# Patient Record
Sex: FEMALE | Race: WHITE | Employment: STUDENT | URBAN - METROPOLITAN AREA
[De-identification: names, ages, dates, MRNs, and addresses within clinical notes are randomized per-mention and may not be internally consistent; named-entity substitution may affect disease eponyms.]

---

## 2018-06-19 ENCOUNTER — OFFICE VISIT (OUTPATIENT)
Dept: OBGYN | Facility: CLINIC | Age: 18
End: 2018-06-19
Attending: NURSE PRACTITIONER
Payer: COMMERCIAL

## 2018-06-19 VITALS
HEIGHT: 63 IN | DIASTOLIC BLOOD PRESSURE: 74 MMHG | SYSTOLIC BLOOD PRESSURE: 120 MMHG | BODY MASS INDEX: 19.31 KG/M2 | WEIGHT: 109 LBS | HEART RATE: 93 BPM

## 2018-06-19 DIAGNOSIS — N92.6 IRREGULAR MENSES: Primary | ICD-10-CM

## 2018-06-19 DIAGNOSIS — Z30.011 ENCOUNTER FOR INITIAL PRESCRIPTION OF CONTRACEPTIVE PILLS: ICD-10-CM

## 2018-06-19 PROCEDURE — G0463 HOSPITAL OUTPT CLINIC VISIT: HCPCS | Mod: ZF

## 2018-06-19 RX ORDER — NORETHINDRONE ACETATE AND ETHINYL ESTRADIOL .02; 1 MG/1; MG/1
1 TABLET ORAL DAILY
Qty: 63 TABLET | Refills: 0 | Status: SHIPPED | OUTPATIENT
Start: 2018-06-19 | End: 2018-08-20

## 2018-06-19 ASSESSMENT — PAIN SCALES - GENERAL: PAINLEVEL: NO PAIN (0)

## 2018-06-19 NOTE — NURSING NOTE
Chief Complaint   Patient presents with     Establish Care     C/O irregular periods   Misty Platt LPN

## 2018-06-19 NOTE — PROGRESS NOTES
"SUBJECTIVE:   Janey is a 17 yr old female, , who presents to clinic for evaluation of abnormal menses.    Used to have irregular length of cycles; now has regular intervals between menses.  Occasionally passes string-like, greyish-pink in color discharge during some menses mixed in with a little bit of bright red bleeding, and other menses are heavy, with bright red bleeding; uses a Divacup that she changes every 3-4 hours.     Occasionally has dysmenorrhea; takes ibuprofen which relieves discomfort.    Denies nausea, vomiting, or diarrhea associated with menses.     Menarche: age 12  Never been on birth control or other medication to control menses. Never been sexually active.     Denies change in vaginal discharge, vaginal odor, or itching/ irritation.    No hx of eating disoders; denies changes in hair or nails; no significant change in exercise or diet..      Patient's last menstrual period was 2018.    S/p HPV vaccines    Social Hx: Attends [employer] in [place]. Home (in the [facility]) for the summer.     OBJECTIVE:  /74  Pulse 93  Ht 1.6 m (5' 3\")  Wt 49.4 kg (109 lb)  LMP 2018  Breastfeeding? No  BMI 19.31 kg/m2  General: pleasant female in no acute distress    ASSESSMENT:  Irregular menses  (primary encounter diagnosis)  Encounter for initial prescription of contraceptive pills    PLAN:  Provided patient with reassurance that her menstrual pattern is likely a variation of normal, given regular intervals and variation in heaviness of vaginal bleeding.  Counseled pt on options for managing her menses and regulating them.  Janey desires to sart COCs today. Provided instructions for how to intiiate and take the birth control pills and what do to if a pill is missed. If she becomes sexually active in the next 7 days, instructed her to use a back-up method to prevent pregnancy. Pt has no contraindications to estrogen therapy; Reviewed warning signs to watch for and " notify provider of.  Pt to return to clinic before gong back to college in August 2018 to confirm improvement in menses and that she is pleased with this medication.  Janey expressed understanding and agreement with the plan for care.    Sofiya Pisano, JERALD, APRN, WHNP

## 2018-06-19 NOTE — LETTER
"2018       RE: Janey Bain  [address] [place] [address]     Dear Colleague,    Thank you for referring your patient, Janey Bain, to the [facility] at [facility]. Please see a copy of my visit note below.    SUBJECTIVE:   Janey is a 17 yr old female, , who presents to clinic for evaluation of abnormal menses.    Used to have irregular length of cycles; now has regular intervals between menses.  Occasionally passes string-like, greyish-pink in color discharge during some menses mixed in with a little bit of bright red bleeding, and other menses are heavy, with bright red bleeding; uses a Divacup that she changes every 3-4 hours.     Occasionally has dysmenorrhea; takes ibuprofen which relieves discomfort.    Denies nausea, vomiting, or diarrhea associated with menses.     Menarche: age 12  Never been on birth control or other medication to control menses. Never been sexually active.     Denies change in vaginal discharge, vaginal odor, or itching/ irritation.    No hx of eating disoders; denies changes in hair or nails; no significant change in exercise or diet..      Patient's last menstrual period was 2018.    S/p HPV vaccines    Social Hx: Attends [employer] in [place]. Home (in the [facility]) for the summer.     OBJECTIVE:  /74  Pulse 93  Ht 1.6 m (5' 3\")  Wt 49.4 kg (109 lb)  LMP 2018  Breastfeeding? No  BMI 19.31 kg/m2  General: pleasant female in no acute distress    ASSESSMENT:  Irregular menses  (primary encounter diagnosis)  Encounter for initial prescription of contraceptive pills    PLAN:  Provided patient with reassurance that her menstrual pattern is likely a variation of normal, given regular intervals and variation in heaviness of vaginal bleeding.  Counseled pt on options for managing her menses and regulating them.  Janey desires to sart COCs today. Provided instructions " for how to intiiate and take the birth control pills and what do to if a pill is missed. If she becomes sexually active in the next 7 days, instructed her to use a back-up method to prevent pregnancy. Pt has no contraindications to estrogen therapy; Reviewed warning signs to watch for and notify provider of.  Pt to return to clinic before gong back to college in August 2018 to confirm improvement in menses and that she is pleased with this medication.  Janey expressed understanding and agreement with the plan for care.    Sofiya Pisano, DNP, APRN, WHNP

## 2018-06-19 NOTE — MR AVS SNAPSHOT
"              After Visit Summary   6/19/2018    Janey Bain    MRN: [id number]           Patient Information     Date Of Birth          2000        Visit Information        Provider Department      6/19/2018 2:15 PM Sofiya Pisano APRN [facility]        Today's Diagnoses     Irregular menses    -  1    Encounter for initial prescription of contraceptive pills           Follow-ups after your visit        Who to contact     Please call your clinic at [phone] to:    Ask questions about your health    Make or cancel appointments    Discuss your medicines    Learn about your test results    Speak to your doctor            Additional Information About Your Visit        MyChart Information     [employer]more gives you secure access to your electronic health record. If you see a primary care provider, you can also send messages to your care team and make appointments. If you have questions, please call your primary care clinic.  If you do not have a primary care provider, please call [phone] and they will assist you.      [employer]ersmore is an electronic gateway that provides easy, online access to your medical records. With [employer]more, you can request a clinic appointment, read your test results, renew a prescription or communicate with your care team.     To access your existing account, please contact your [facility] or call [phone] for assistance.        Care EveryWhere ID     This is your Care EveryWhere ID. This could be used by other organizations to access your [facility] medical records  [id number]        Your Vitals Were     Pulse Height Last Period Breastfeeding? BMI (Body Mass Index)       93 1.6 m (5' 3\") 06/05/2018 No 19.31 kg/m2        Blood Pressure from Last 3 Encounters:   06/19/18 120/74   07/13/16 110/74   06/04/16 100/64    Weight from Last 3 Encounters:   06/19/18 49.4 kg (109 lb) (14 %)*   07/13/16 47.2 kg (104 lb) (10 %)* "   06/04/16 46.3 kg (102 lb) (8 %)*     * Growth percentiles are based on [facility] 2-20 Years data.              Today, you had the following     No orders found for display         Today's Medication Changes          These changes are accurate as of 6/19/18 11:59 PM.  If you have any questions, ask your nurse or doctor.               Start taking these medicines.        Dose/Directions    norethindrone-ethinyl estradiol 1-20 MG-MCG per tablet   Commonly known as:  MICROGESTIN 1/20   Used for:  Encounter for initial prescription of contraceptive pills, Irregular menses   Started by:  Sofiya Pisano APRN CNP        Dose:  1 tablet   Take 1 tablet by mouth daily   Quantity:  63 tablet   Refills:  0         Stop taking these medicines if you haven't already. Please contact your care team if you have questions.     albuterol 108 (90 Base) MCG/ACT Inhaler   Commonly known as:  PROAIR HFA/PROVENTIL HFA/VENTOLIN HFA   Stopped by:  Sofiya Pisano APRN CNP           TYLENOL CHILDRENS PO   Stopped by:  Sofiya Pisano APRN CNP                Where to get your medicines      These medications were sent to [facility] [address] [place] [address] AT [address] [address]    Hours:  24-hours Phone:  [phone]     norethindrone-ethinyl estradiol 1-20 MG-MCG per tablet                Primary Care Provider Office Phone # Fax #    Maricel Ho MD PhD [phone]       [address] [place] [address]        Equal Access to Services     [facility] AH: Clark Thomas, tray chung, pastora howell. So [facility] [phone].    ATENCIÓN: Si habla español, tiene a quinn disposición servicios gratuitos de asistencia lingüística. Dominic espinosa [phone].    We comply with applicable federal civil rights laws and [place] laws. We do not  discriminate on the basis of race, color, national origin, age, disability, sex, sexual orientation, or gender identity.            Thank you!     Thank you for choosing [facility]  for your care. Our goal is always to provide you with excellent care. Hearing back from our patients is one way we can continue to improve our services. Please take a few minutes to complete the written survey that you may receive in the mail after your visit with us. Thank you!             Your Updated Medication List - Protect others around you: Learn how to safely use, store and throw away your medicines at www.disposemymeds.org.          This list is accurate as of 6/19/18 11:59 PM.  Always use your most recent med list.                   Brand Name Dispense Instructions for use Diagnosis    norethindrone-ethinyl estradiol 1-20 MG-MCG per tablet    MICROGESTIN 1/20    63 tablet    Take 1 tablet by mouth daily    Encounter for initial prescription of contraceptive pills, Irregular menses

## 2018-07-10 ENCOUNTER — OFFICE VISIT (OUTPATIENT)
Dept: URGENT CARE | Facility: URGENT CARE | Age: 18
End: 2018-07-10
Payer: COMMERCIAL

## 2018-07-10 VITALS — OXYGEN SATURATION: 100 % | WEIGHT: 110.4 LBS | HEART RATE: 89 BPM | BODY MASS INDEX: 19.56 KG/M2 | TEMPERATURE: 98.5 F

## 2018-07-10 DIAGNOSIS — R07.0 THROAT PAIN: ICD-10-CM

## 2018-07-10 DIAGNOSIS — J03.80 ACUTE TONSILLITIS DUE TO INFECTIOUS MONONUCLEOSIS: Primary | ICD-10-CM

## 2018-07-10 DIAGNOSIS — B27.90 ACUTE TONSILLITIS DUE TO INFECTIOUS MONONUCLEOSIS: Primary | ICD-10-CM

## 2018-07-10 LAB
DEPRECATED S PYO AG THROAT QL EIA: NORMAL
HETEROPH AB SER QL: POSITIVE
SPECIMEN SOURCE: NORMAL

## 2018-07-10 PROCEDURE — 87880 STREP A ASSAY W/OPTIC: CPT | Performed by: PHYSICIAN ASSISTANT

## 2018-07-10 PROCEDURE — 87081 CULTURE SCREEN ONLY: CPT | Performed by: PHYSICIAN ASSISTANT

## 2018-07-10 PROCEDURE — 99214 OFFICE O/P EST MOD 30 MIN: CPT | Performed by: PHYSICIAN ASSISTANT

## 2018-07-10 PROCEDURE — 36415 COLL VENOUS BLD VENIPUNCTURE: CPT | Performed by: PHYSICIAN ASSISTANT

## 2018-07-10 PROCEDURE — 86308 HETEROPHILE ANTIBODY SCREEN: CPT | Performed by: PHYSICIAN ASSISTANT

## 2018-07-10 RX ORDER — DEXAMETHASONE SODIUM PHOSPHATE 10 MG/ML
6 INJECTION INTRAMUSCULAR; INTRAVENOUS ONCE
Qty: 0.6 ML | Refills: 0
Start: 2018-07-10 | End: 2019-08-12

## 2018-07-10 NOTE — PATIENT INSTRUCTIONS
Your Strep test was negative.  Your mono test was positive.  We gave you a dose of steroids today to help with tonsil enlargement.  This will last in your body for a few days.    See below info on mononucleosis treatment and care for your symptoms.      Mononucleosis  Mononucleosis (also called mono) is a contagious viral infection. Most infants and children exposed to the virus get only mild flu-like symptoms or no symptoms at all. However, infection is usually more serious in teens and young adults. While the virus is active it causes symptoms and can spread to others. After symptoms subside, the virus stays in the body and eventually becomes inactive. Once you have one case of mono, you are unlikely to develop symptoms again.  The virus is usually spread by contact with saliva, often by kissing. It may also spread by breastmilk, blood, or sexual contact. It takes about 4 to 6 weeks to develop symptoms after exposure.  Early symptoms include headache, nausea, tiredness and general muscle aching. This is followed by sore throat and fever. Lymph glands in the neck, under the arms, or in the groin may be swollen. Symptoms usually go away in about 1 to 2 months. But they can last up to four months.  If symptoms have been present less than 1 week or more than 3 weeks, the blood test used to diagnose this disease may be negative even though you have the illness.  In this case, other tests may be done.  Taking the antibiotics ampicillin or amoxicillin during a mono infection may cause a skin rash. This is not serious and will fade in about one week. The cause is a reaction of the drug with the virus.  Mono can cause your spleen to swell. The spleen is a fist-sized organ in the upper left abdomen that stores red blood cells. Injury to a swollen spleen can cause the spleen to rupture. This can cause life-threatening internal bleeding. To avoid this, do not play contact sports or perform strenuous activity for 8 weeks, or  until cleared by your healthcare provider. A sharp blow could rupture a swollen spleen  Home care    Rest in bed until the fever and weakness have gone away.    Drink plenty of fluids, but avoid alcohol. Otherwise, you may eat a regular diet.    Ask your healthcare provider about using over-the-counter medicines to treat symptoms such as fever, pain, or an itchy rash.    Over-the-counter throat lozenges may help soothe a sore throat. Gargling with warm salt water (1/2 teaspoon in 1 glass of warm water) may also be soothing to the throat.    You may return to work or school after the fever goes away and you are feeling better. Continue to follow any activity restrictions you have been given.  Preventing spread of the virus  To limit the spread of the virus, avoid exposing others to your saliva for at least 6 months after your illness (no kissing or sharing utensils, drinking glasses, or toothbrushes).  Follow-up care  Follow up with your healthcare provider within 1 to 2 weeks or as advised by our staff to be sure that there are no complications. If symptoms of extreme fatigue and swollen glands last longer than 6 months, see your healthcare provider for further testing.  When to seek medical advice  Call your healthcare provider right away if any of the following occur:    Excessive coughing    Yellow skin or eyes    Trouble swallowing  Call 911  Call 911 if any of the following occur:    Severe or worsening abdominal pain    Trouble breathing  Date Last Reviewed: 9/25/2015 2000-2017 [employer]. [address] [place] [address], [place] [address]. All rights reserved. This information is not intended as a substitute for professional medical care. Always follow your healthcare professional's instructions.

## 2018-07-10 NOTE — MR AVS SNAPSHOT
After Visit Summary   7/10/2018    Janey Bain    MRN: [id number]           Patient Information     Date Of Birth          2000        Visit Information        Provider Department      7/10/2018 5:05 PM Mary Luz PA-C [facility]        Today's Diagnoses     Acute tonsillitis due to infectious mononucleosis    -  1    Throat pain          Care Instructions    Your Strep test was negative.  Your mono test was positive.  We gave you a dose of steroids today to help with tonsil enlargement.  This will last in your body for a few days.    See below info on mononucleosis treatment and care for your symptoms.      Mononucleosis  Mononucleosis (also called mono) is a contagious viral infection. Most infants and children exposed to the virus get only mild flu-like symptoms or no symptoms at all. However, infection is usually more serious in teens and young adults. While the virus is active it causes symptoms and can spread to others. After symptoms subside, the virus stays in the body and eventually becomes inactive. Once you have one case of mono, you are unlikely to develop symptoms again.  The virus is usually spread by contact with saliva, often by kissing. It may also spread by breastmilk, blood, or sexual contact. It takes about 4 to 6 weeks to develop symptoms after exposure.  Early symptoms include headache, nausea, tiredness and general muscle aching. This is followed by sore throat and fever. Lymph glands in the neck, under the arms, or in the groin may be swollen. Symptoms usually go away in about 1 to 2 months. But they can last up to four months.  If symptoms have been present less than 1 week or more than 3 weeks, the blood test used to diagnose this disease may be negative even though you have the illness.  In this case, other tests may be done.  Taking the antibiotics ampicillin or amoxicillin during a mono infection may cause a skin rash. This is  not serious and will fade in about one week. The cause is a reaction of the drug with the virus.  Mono can cause your spleen to swell. The spleen is a fist-sized organ in the upper left abdomen that stores red blood cells. Injury to a swollen spleen can cause the spleen to rupture. This can cause life-threatening internal bleeding. To avoid this, do not play contact sports or perform strenuous activity for 8 weeks, or until cleared by your healthcare provider. A sharp blow could rupture a swollen spleen  Home care    Rest in bed until the fever and weakness have gone away.    Drink plenty of fluids, but avoid alcohol. Otherwise, you may eat a regular diet.    Ask your healthcare provider about using over-the-counter medicines to treat symptoms such as fever, pain, or an itchy rash.    Over-the-counter throat lozenges may help soothe a sore throat. Gargling with warm salt water (1/2 teaspoon in 1 glass of warm water) may also be soothing to the throat.    You may return to work or school after the fever goes away and you are feeling better. Continue to follow any activity restrictions you have been given.  Preventing spread of the virus  To limit the spread of the virus, avoid exposing others to your saliva for at least 6 months after your illness (no kissing or sharing utensils, drinking glasses, or toothbrushes).  Follow-up care  Follow up with your healthcare provider within 1 to 2 weeks or as advised by our staff to be sure that there are no complications. If symptoms of extreme fatigue and swollen glands last longer than 6 months, see your healthcare provider for further testing.  When to seek medical advice  Call your healthcare provider right away if any of the following occur:    Excessive coughing    Yellow skin or eyes    Trouble swallowing  Call 911  Call 911 if any of the following occur:    Severe or worsening abdominal pain    Trouble breathing  Date Last Reviewed: 9/25/2015 2000-2017 [facility]  [employer]. [address] [place] [address]. All rights reserved. This information is not intended as a substitute for professional medical care. Always follow your healthcare professional's instructions.                Follow-ups after your visit        Follow-up notes from your care team     Return in about 1 week (around 7/17/2018).      Who to contact     If you have questions or need follow up information about today's clinic visit or your schedule please contact [facility] directly at [phone].  Normal or non-critical lab and imaging results will be communicated to you by GetBulbhart, letter or phone within 4 business days after the clinic has received the results. If you do not hear from us within 7 days, please contact the clinic through [employer] or phone. If you have a critical or abnormal lab result, we will notify you by phone as soon as possible.  Submit refill requests through [employer] or call your pharmacy and they will forward the refill request to us. Please allow 3 business days for your refill to be completed.          Additional Information About Your Visit        GetBulbhart Information     [employer] gives you secure access to your electronic health record. If you see a primary care provider, you can also send messages to your care team and make appointments. If you have questions, please call your primary care clinic.  If you do not have a primary care provider, please call [phone] and they will assist you.        Care EveryWhere ID     This is your Care EveryWhere ID. This could be used by other organizations to access your [facility] medical records  [id number]        Your Vitals Were     Pulse Temperature Pulse Oximetry BMI (Body Mass Index)          89 98.5  F (36.9  C) (Tympanic) 100% 19.56 kg/m2         Blood Pressure from Last 3 Encounters:   06/19/18 120/74   07/13/16 110/74   06/04/16 100/64    Weight from Last 3 Encounters:   07/10/18 110 lb 6.4 oz  (50.1 kg) (16 %)*   06/19/18 109 lb (49.4 kg) (14 %)*   07/13/16 104 lb (47.2 kg) (10 %)*     * Growth percentiles are based on [facility] 2-20 Years data.              We Performed the Following     Beta strep group A culture     Mononucleosis screen     Strep, Rapid Screen          Today's Medication Changes          These changes are accurate as of 7/10/18  6:28 PM.  If you have any questions, ask your nurse or doctor.               Start taking these medicines.        Dose/Directions    dexamethasone 10 MG/ML injection   Commonly known as:  DECADRON   Used for:  Acute tonsillitis due to infectious mononucleosis   Started by:  Mary Luz PA-C        Dose:  6 mg   Inject 0.6 mLs (6 mg) into the vein once for 1 dose   Quantity:  0.6 mL   Refills:  0            Where to get your medicines      Some of these will need a paper prescription and others can be bought over the counter.  Ask your nurse if you have questions.     You don't need a prescription for these medications     dexamethasone 10 MG/ML injection                Primary Care Provider Office Phone # Fax #    Maricel Ho MD PhD [phone]       [address] [place] [address]        Equal Access to Services     DALI SAWYER AH: Clark Thomas, watuda juliet, qaeuniceta kaalmada sean, pastora spencer. So [facility] [phone].    ATENCIÓN: Si habla español, tiene a quinn disposición servicios gratuitos de asistencia lingüística. Llame al [phone].    We comply with applicable federal civil rights laws and [place] laws. We do not discriminate on the basis of race, color, national origin, age, disability, sex, sexual orientation, or gender identity.            Thank you!     Thank you for choosing [facility]  for your care. Our goal is always to provide you with excellent care. Hearing back from our patients is one way we can continue to improve our  services. Please take a few minutes to complete the written survey that you may receive in the mail after your visit with us. Thank you!             Your Updated Medication List - Protect others around you: Learn how to safely use, store and throw away your medicines at www.disposemymeds.org.          This list is accurate as of 7/10/18  6:28 PM.  Always use your most recent med list.                   Brand Name Dispense Instructions for use Diagnosis    dexamethasone 10 MG/ML injection    DECADRON    0.6 mL    Inject 0.6 mLs (6 mg) into the vein once for 1 dose    Acute tonsillitis due to infectious mononucleosis       norethindrone-ethinyl estradiol 1-20 MG-MCG per tablet    MICROGESTIN 1/20    63 tablet    Take 1 tablet by mouth daily    Encounter for initial prescription of contraceptive pills, Irregular menses

## 2018-07-10 NOTE — PROGRESS NOTES
"SUBJECTIVE:   Janey Bain is a 17 year old female presenting with a chief complaint of   Chief Complaint   Patient presents with     Urgent Care     Pt in clinic to have eval for sore throat.     Pharyngitis       Onset of symptoms was 1 week(s) ago.  Course of illness is worsening.    Severity moderately severe  Current and Associated symptoms: she had onset of sore throat last week. Then, developed cough, headache, body aches. No fever. No runny nose. She admits to nasal congestion. Most of the symptoms have improved and almost resolved, however the sore throat has gotten worse. She admits to having swollen glands in her neck. She is tolerating PO intake. No nausea, vomiting. No rashes.   Treatment measures tried include: DayQuil, Tylenol  Predisposing factors include: no known ill contacts.     ROS   See HPI, otherwise healthy    PMH:  Past Medical History:   Diagnosis Date     Pneumonia 2012    hospitalized       Current medications:  Current Outpatient Prescriptions   Medication Sig Dispense Refill     dexamethasone (DECADRON) 10 MG/ML injection Inject 0.6 mLs (6 mg) into the vein once for 1 dose 0.6 mL 0     norethindrone-ethinyl estradiol (MICROGESTIN 1/20) 1-20 MG-MCG per tablet Take 1 tablet by mouth daily (Patient not taking: Reported on 7/10/2018) 63 tablet 0       Surgical History:  Past Surgical History:   Procedure Laterality Date     NO HISTORY OF SURGERY         Family history:  Family History   Problem Relation Age of Onset     Breast Cancer Mother      early 50's; \"negative genetic testing\"     Lymphoma Maternal Grandmother      Colon Cancer Paternal Grandmother      Coronary Artery Disease Early Onset Paternal Grandfather      Cerebrovascular Disease Paternal Grandfather          Social History:  Social History   Substance Use Topics     Smoking status: Never Smoker     Smokeless tobacco: Never Used      Comment: nonsmoking home     Alcohol use Not on file      Comment: none  "         OBJECTIVE  Pulse 89  Temp 98.5  F (36.9  C) (Tympanic)  Wt 110 lb 6.4 oz (50.1 kg)  SpO2 100%  BMI 19.56 kg/m2    General: alert, appears well, NAD. Afebrile.  Skin: no suspicious lesions or rashes.  HEENT: Normocephalic.   Eyes: conjunctiva clear.   Ears: TMs with mild serous effusion bilaterally. No erythema or bulging.   Nose: no nasal polyps. No edema of nasal mucosa. No rhinorrhea.  Oropharynx: MMM. 2+ bilateral tonsillar hypertrophy with scant, spotty white exudates. No tonsillar erythema. No posterior pharyngeal erythema. No oral lesions. Uvula midline.    Neck: supple, no lymphadenopathy. Neck AROM intact- no meningismus.  Respiratory: No distress. Equal inspiration to bilateral bases. No crackles wheeze, rhonchi, rales.   Cardiovascular: RRR. No murmurs, clicks, gallups, or rub.   Gastrointestinal: Abdomen soft, nontender, BS present. No masses, organomegaly.  Psychiatric: mentation appears normal and affect normal/bright           Labs:  Results for orders placed or performed in visit on 07/10/18 (from the past 24 hour(s))   Strep, Rapid Screen   Result Value Ref Range    Specimen Description Throat     Rapid Strep A Screen       NEGATIVE: No Group A streptococcal antigen detected by immunoassay, await culture report.   Mononucleosis screen   Result Value Ref Range    Mononucleosis Screen Positive (A) NEG^Negative           ASSESSMENT/PLAN:    ICD-10-CM    1. Acute tonsillitis due to infectious mononucleosis J03.80 dexamethasone (DECADRON) 10 MG/ML injection    B27.90    2. Throat pain R07.0 Strep, Rapid Screen     Beta strep group A culture     Mononucleosis screen           Medical Decision Making:    Otherwise healthy 16yo female presenting for sore throat x 1 week. Patient appears well and is tolerating oral intake. No signs of peritonsillar or retropharyngeal abscess on exam. Clear lungs.     Differential Diagnosis:  -Strep tonsillitis- negative Rapid Strep.   -other bacterial tonsillitis-  possible, but have less suspicion as she has been afebrile, appears well, and there are only few spotty exudates. With positive monospot as below, do not think that starting antibiotics is indicated today.  -mononucleosis- have most suspicion for this as she is afebrile, had other URI symptoms at outset of illness, and duration of illness for 1 week - longer than I would expect for a bacterial tonsillitis to present. Monospot today was positive. She does not appear to have any signs of complications of mono today. No splenomegaly on abdominal exam. She appears nontoxic. I think she is appropriate for ongoing outpatient therapy.    Discussed course of mono. Discussed contagiousness. Discussed follow up- recommend f/up with PCP in 1 week for a recheck.  Discussed indications for sooner follow up.    At the end of the encounter, I discussed all available results, as well as the diagnosis and any associated medications. I discussed red flags for immediate return to clinic/ER, as well as indications for follow up. Refer to patient instructions below, which were all addressed with patient. Patient understood and agreed to plan. Patient was appropriate for discharge.      Patient Instructions   Your Strep test was negative.  Your mono test was positive.  We gave you a dose of steroids today to help with tonsil enlargement.  This will last in your body for a few days.    See below info on mononucleosis treatment and care for your symptoms.      Mononucleosis  Mononucleosis (also called mono) is a contagious viral infection. Most infants and children exposed to the virus get only mild flu-like symptoms or no symptoms at all. However, infection is usually more serious in teens and young adults. While the virus is active it causes symptoms and can spread to others. After symptoms subside, the virus stays in the body and eventually becomes inactive. Once you have one case of mono, you are unlikely to develop symptoms  again.  The virus is usually spread by contact with saliva, often by kissing. It may also spread by breastmilk, blood, or sexual contact. It takes about 4 to 6 weeks to develop symptoms after exposure.  Early symptoms include headache, nausea, tiredness and general muscle aching. This is followed by sore throat and fever. Lymph glands in the neck, under the arms, or in the groin may be swollen. Symptoms usually go away in about 1 to 2 months. But they can last up to four months.  If symptoms have been present less than 1 week or more than 3 weeks, the blood test used to diagnose this disease may be negative even though you have the illness.  In this case, other tests may be done.  Taking the antibiotics ampicillin or amoxicillin during a mono infection may cause a skin rash. This is not serious and will fade in about one week. The cause is a reaction of the drug with the virus.  Mono can cause your spleen to swell. The spleen is a fist-sized organ in the upper left abdomen that stores red blood cells. Injury to a swollen spleen can cause the spleen to rupture. This can cause life-threatening internal bleeding. To avoid this, do not play contact sports or perform strenuous activity for 8 weeks, or until cleared by your healthcare provider. A sharp blow could rupture a swollen spleen  Home care    Rest in bed until the fever and weakness have gone away.    Drink plenty of fluids, but avoid alcohol. Otherwise, you may eat a regular diet.    Ask your healthcare provider about using over-the-counter medicines to treat symptoms such as fever, pain, or an itchy rash.    Over-the-counter throat lozenges may help soothe a sore throat. Gargling with warm salt water (1/2 teaspoon in 1 glass of warm water) may also be soothing to the throat.    You may return to work or school after the fever goes away and you are feeling better. Continue to follow any activity restrictions you have been given.  Preventing spread of the  virus  To limit the spread of the virus, avoid exposing others to your saliva for at least 6 months after your illness (no kissing or sharing utensils, drinking glasses, or toothbrushes).  Follow-up care  Follow up with your healthcare provider within 1 to 2 weeks or as advised by our staff to be sure that there are no complications. If symptoms of extreme fatigue and swollen glands last longer than 6 months, see your healthcare provider for further testing.  When to seek medical advice  Call your healthcare provider right away if any of the following occur:    Excessive coughing    Yellow skin or eyes    Trouble swallowing  Call 911  Call 911 if any of the following occur:    Severe or worsening abdominal pain    Trouble breathing  Date Last Reviewed: 9/25/2015 2000-2017 [employer]. [address] [place] [address], [place] [address]. All rights reserved. This information is not intended as a substitute for professional medical care. Always follow your healthcare professional's instructions.                  Mary Luz PA-C

## 2018-07-11 LAB
BACTERIA SPEC CULT: NORMAL
SPECIMEN SOURCE: NORMAL

## 2018-07-14 ENCOUNTER — HOSPITAL ENCOUNTER (EMERGENCY)
Facility: CLINIC | Age: 18
Discharge: HOME | End: 2018-07-14
Attending: EMERGENCY MEDICINE | Admitting: EMERGENCY MEDICINE
Payer: COMMERCIAL

## 2018-07-14 VITALS
HEART RATE: 110 BPM | SYSTOLIC BLOOD PRESSURE: 113 MMHG | OXYGEN SATURATION: 98 % | BODY MASS INDEX: 19.84 KG/M2 | DIASTOLIC BLOOD PRESSURE: 71 MMHG | WEIGHT: 112 LBS | TEMPERATURE: 100.7 F | RESPIRATION RATE: 16 BRPM

## 2018-07-14 DIAGNOSIS — B17.8 MONONUCLEOSIS, INFECTIOUS, WITH HEPATITIS: ICD-10-CM

## 2018-07-14 DIAGNOSIS — B27.99 MONONUCLEOSIS, INFECTIOUS, WITH HEPATITIS: ICD-10-CM

## 2018-07-14 LAB
ALBUMIN SERPL-MCNC: 3.3 G/DL (ref 3.4–5)
ALP SERPL-CCNC: 208 U/L (ref 40–150)
ALT SERPL W P-5'-P-CCNC: 312 U/L (ref 0–50)
ANION GAP SERPL CALCULATED.3IONS-SCNC: 6 MMOL/L (ref 3–14)
ANISOCYTOSIS BLD QL SMEAR: SLIGHT
AST SERPL W P-5'-P-CCNC: 100 U/L (ref 0–35)
BASOPHILS # BLD AUTO: 0.1 10E9/L (ref 0–0.2)
BASOPHILS NFR BLD AUTO: 0.9 %
BILIRUB SERPL-MCNC: 0.2 MG/DL (ref 0.2–1.3)
BUN SERPL-MCNC: 7 MG/DL (ref 7–30)
CALCIUM SERPL-MCNC: 8.9 MG/DL (ref 8.5–10.1)
CHLORIDE SERPL-SCNC: 107 MMOL/L (ref 96–110)
CO2 SERPL-SCNC: 29 MMOL/L (ref 20–32)
CREAT SERPL-MCNC: 0.64 MG/DL (ref 0.5–1)
DEPRECATED S PYO AG THROAT QL EIA: NORMAL
DIFFERENTIAL METHOD BLD: ABNORMAL
EOSINOPHIL # BLD AUTO: 0.1 10E9/L (ref 0–0.7)
EOSINOPHIL NFR BLD AUTO: 1 %
ERYTHROCYTE [DISTWIDTH] IN BLOOD BY AUTOMATED COUNT: 13.8 % (ref 10–15)
GFR SERPL CREATININE-BSD FRML MDRD: >90 ML/MIN/1.7M2
GLUCOSE SERPL-MCNC: 117 MG/DL (ref 70–99)
HCT VFR BLD AUTO: 39.4 % (ref 35–47)
HGB BLD-MCNC: 12.8 G/DL (ref 11.7–15.7)
IMM GRANULOCYTES # BLD: 0 10E9/L (ref 0–0.4)
IMM GRANULOCYTES NFR BLD: 0.2 %
LYMPHOCYTES # BLD AUTO: 6.6 10E9/L (ref 0.8–5.3)
LYMPHOCYTES NFR BLD AUTO: 57.2 %
MACROCYTES BLD QL SMEAR: PRESENT
MCH RBC QN AUTO: 28 PG (ref 26.5–33)
MCHC RBC AUTO-ENTMCNC: 32.5 G/DL (ref 31.5–36.5)
MCV RBC AUTO: 86 FL (ref 78–100)
MONOCYTES # BLD AUTO: 1.2 10E9/L (ref 0–1.3)
MONOCYTES NFR BLD AUTO: 10.7 %
NEUTROPHILS # BLD AUTO: 3.4 10E9/L (ref 1.6–8.3)
NEUTROPHILS NFR BLD AUTO: 30 %
NRBC # BLD AUTO: 0 10*3/UL
NRBC BLD AUTO-RTO: 0 /100
PLATELET # BLD AUTO: 315 10E9/L (ref 150–450)
PLATELET # BLD EST: NORMAL 10*3/UL
POTASSIUM SERPL-SCNC: 4.2 MMOL/L (ref 3.4–5.3)
PROT SERPL-MCNC: 8 G/DL (ref 6.8–8.8)
RBC # BLD AUTO: 4.57 10E12/L (ref 3.8–5.2)
SODIUM SERPL-SCNC: 142 MMOL/L (ref 133–144)
SPECIMEN SOURCE: NORMAL
WBC # BLD AUTO: 11.5 10E9/L (ref 4–11)

## 2018-07-14 PROCEDURE — 87081 CULTURE SCREEN ONLY: CPT | Performed by: EMERGENCY MEDICINE

## 2018-07-14 PROCEDURE — 85025 COMPLETE CBC W/AUTO DIFF WBC: CPT | Performed by: EMERGENCY MEDICINE

## 2018-07-14 PROCEDURE — 25000132 ZZH RX MED GY IP 250 OP 250 PS 637: Performed by: EMERGENCY MEDICINE

## 2018-07-14 PROCEDURE — 99284 EMERGENCY DEPT VISIT MOD MDM: CPT | Mod: Z6 | Performed by: EMERGENCY MEDICINE

## 2018-07-14 PROCEDURE — 80053 COMPREHEN METABOLIC PANEL: CPT | Performed by: EMERGENCY MEDICINE

## 2018-07-14 PROCEDURE — 96375 TX/PRO/DX INJ NEW DRUG ADDON: CPT | Performed by: EMERGENCY MEDICINE

## 2018-07-14 PROCEDURE — 96374 THER/PROPH/DIAG INJ IV PUSH: CPT | Performed by: EMERGENCY MEDICINE

## 2018-07-14 PROCEDURE — 99285 EMERGENCY DEPT VISIT HI MDM: CPT | Mod: 25 | Performed by: EMERGENCY MEDICINE

## 2018-07-14 PROCEDURE — 25000128 H RX IP 250 OP 636: Performed by: EMERGENCY MEDICINE

## 2018-07-14 PROCEDURE — 96361 HYDRATE IV INFUSION ADD-ON: CPT | Performed by: EMERGENCY MEDICINE

## 2018-07-14 PROCEDURE — 87880 STREP A ASSAY W/OPTIC: CPT | Performed by: EMERGENCY MEDICINE

## 2018-07-14 RX ORDER — OXYCODONE HCL 5 MG/5 ML
5 SOLUTION, ORAL ORAL ONCE
Status: COMPLETED | OUTPATIENT
Start: 2018-07-14 | End: 2018-07-14

## 2018-07-14 RX ORDER — DEXAMETHASONE SODIUM PHOSPHATE 10 MG/ML
10 INJECTION, SOLUTION INTRAMUSCULAR; INTRAVENOUS ONCE
Status: COMPLETED | OUTPATIENT
Start: 2018-07-14 | End: 2018-07-14

## 2018-07-14 RX ORDER — OXYCODONE HCL 5 MG/5 ML
5 SOLUTION, ORAL ORAL EVERY 4 HOURS PRN
Qty: 150 ML | Refills: 0 | Status: SHIPPED | OUTPATIENT
Start: 2018-07-14 | End: 2019-08-12

## 2018-07-14 RX ORDER — MORPHINE SULFATE 2 MG/ML
2 INJECTION, SOLUTION INTRAMUSCULAR; INTRAVENOUS ONCE
Status: COMPLETED | OUTPATIENT
Start: 2018-07-14 | End: 2018-07-14

## 2018-07-14 RX ORDER — ONDANSETRON 2 MG/ML
4 INJECTION INTRAMUSCULAR; INTRAVENOUS ONCE
Status: DISCONTINUED | OUTPATIENT
Start: 2018-07-14 | End: 2018-07-15 | Stop reason: HOSPADM

## 2018-07-14 RX ADMIN — SODIUM CHLORIDE 1000 ML: 9 INJECTION, SOLUTION INTRAVENOUS at 21:07

## 2018-07-14 RX ADMIN — DEXAMETHASONE SODIUM PHOSPHATE 10 MG: 10 INJECTION, SOLUTION INTRAMUSCULAR; INTRAVENOUS at 21:14

## 2018-07-14 RX ADMIN — SODIUM CHLORIDE 1000 ML: 9 INJECTION, SOLUTION INTRAVENOUS at 23:03

## 2018-07-14 RX ADMIN — OXYCODONE HYDROCHLORIDE 5 MG: 5 SOLUTION ORAL at 21:57

## 2018-07-14 RX ADMIN — MORPHINE SULFATE 2 MG: 2 INJECTION, SOLUTION INTRAMUSCULAR; INTRAVENOUS at 21:08

## 2018-07-14 RX ADMIN — SODIUM CHLORIDE 1000 ML: 9 INJECTION, SOLUTION INTRAVENOUS at 22:00

## 2018-07-14 ASSESSMENT — ENCOUNTER SYMPTOMS
TROUBLE SWALLOWING: 1
ABDOMINAL PAIN: 0
CHILLS: 1
FATIGUE: 1
SHORTNESS OF BREATH: 1
SORE THROAT: 1
FEVER: 0
HEADACHES: 1

## 2018-07-14 NOTE — ED AVS SNAPSHOT
[facility], [place], Emergency Department    [address]    [facility] [address]    Phone:  [phone]    Fax:  [phone]                                       Janey Bain   MRN: [id number]    Department:  [facility], Emergency Department   Date of Visit:  7/14/2018           After Visit Summary Signature Page     I have received my discharge instructions, and my questions have been answered. I have discussed any challenges I see with this plan with the nurse or doctor.    ..........................................................................................................................................  Patient/Patient Representative Signature      ..........................................................................................................................................  Patient Representative Print Name and Relationship to Patient    ..................................................               ................................................  Date                                            Time    ..........................................................................................................................................  Reviewed by Signature/Title    ...................................................              ..............................................  Date                                                            Time

## 2018-07-14 NOTE — ED AVS SNAPSHOT
[facility], Emergency Department    [address]    [place] [address]    Phone:  [phone]    Fax:  [phone]                                       Janey Bain   MRN: [id number]    Department:  [facility], Emergency Department   Date of Visit:  7/14/2018           Patient Information     Date Of Birth          2000        Your diagnoses for this visit were:     Mononucleosis, infectious, with hepatitis        You were seen by Umu Casiano MD.        Discharge Instructions       Go home and rest.  Focus on staying hydrated.      You can take roxicodone for pain if needed.     Please follow up with your primary care doctor early this week for recheck.  Return if worsening.  Your liver tests are elevated and will need to be rechecked in the future.  Avoid tylenol since this is metabolized by the liver.         Mononucleosis  Mononucleosis (also called mono) is a contagious viral infection. Most infants and children exposed to the virus get only mild flu-like symptoms or no symptoms at all. However, infection is usually more serious in teens and young adults. While the virus is active it causes symptoms and can spread to others. After symptoms subside, the virus stays in the body and eventually becomes inactive. Once you have one case of mono, you are unlikely to develop symptoms again.  The virus is usually spread by contact with saliva, often by kissing. It may also spread by breastmilk, blood, or sexual contact. It takes about 4 to 6 weeks to develop symptoms after exposure.  Early symptoms include headache, nausea, tiredness and general muscle aching. This is followed by sore throat and fever. Lymph glands in the neck, under the arms, or in the groin may be swollen. Symptoms usually go away in about 1 to 2 months. But they can last up to four months.  If symptoms have been present less than 1 week or more than 3 weeks, the blood test used to diagnose this disease may be negative even  though you have the illness.  In this case, other tests may be done.  Taking the antibiotics ampicillin or amoxicillin during a mono infection may cause a skin rash. This is not serious and will fade in about one week. The cause is a reaction of the drug with the virus.  Mono can cause your spleen to swell. The spleen is a fist-sized organ in the upper left abdomen that stores red blood cells. Injury to a swollen spleen can cause the spleen to rupture. This can cause life-threatening internal bleeding. To avoid this, do not play contact sports or perform strenuous activity for 8 weeks, or until cleared by your healthcare provider. A sharp blow could rupture a swollen spleen  Home care    Rest in bed until the fever and weakness have gone away.    Drink plenty of fluids, but avoid alcohol. Otherwise, you may eat a regular diet.    Ask your healthcare provider about using over-the-counter medicines to treat symptoms such as fever, pain, or an itchy rash.    Over-the-counter throat lozenges may help soothe a sore throat. Gargling with warm salt water (1/2 teaspoon in 1 glass of warm water) may also be soothing to the throat.    You may return to work or school after the fever goes away and you are feeling better. Continue to follow any activity restrictions you have been given.  Preventing spread of the virus  To limit the spread of the virus, avoid exposing others to your saliva for at least 6 months after your illness (no kissing or sharing utensils, drinking glasses, or toothbrushes).  Follow-up care  Follow up with your healthcare provider within 1 to 2 weeks or as advised by our staff to be sure that there are no complications. If symptoms of extreme fatigue and swollen glands last longer than 6 months, see your healthcare provider for further testing.  When to seek medical advice  Call your healthcare provider right away if any of the following occur:    Excessive coughing    Yellow skin or eyes    Trouble  swallowing  Call 911  Call 911 if any of the following occur:    Severe or worsening abdominal pain    Trouble breathing  Date Last Reviewed: 9/25/2015 2000-2017 [employer]. [address] [place] [address], [place] [address]. All rights reserved. This information is not intended as a substitute for professional medical care. Always follow your healthcare professional's instructions.          Your next 10 appointments already scheduled     Jul 18, 2018  7:00 AM CDT   (Arrive by 6:45 AM)   Return Visit with Ishmael Espinosa MD   [facility] ([facility])    [address] [place] [address]  [address] [facility] [address] [phone]              24 Hour Appointment Hotline       To make an appointment at any [facility], call [phone] ([phone]). If you don't have a family doctor or clinic, we will help you find one. [facility] are conveniently located to serve the needs of you and your family.             Review of your medicines      START taking        Dose / Directions Last dose taken    oxyCODONE 5 MG/5ML solution   Commonly known as:  ROXICODONE   Dose:  5 mg   Quantity:  150 mL        Take 5 mLs (5 mg) by mouth every 4 hours as needed for pain   Refills:  0          Our records show that you are taking the medicines listed below. If these are incorrect, please call your family doctor or clinic.        Dose / Directions Last dose taken    dexamethasone 10 MG/ML injection   Commonly known as:  DECADRON   Dose:  6 mg   Quantity:  0.6 mL        Inject 0.6 mLs (6 mg) into the vein once for 1 dose   Refills:  0        norethindrone-ethinyl estradiol 1-20 MG-MCG per tablet   Commonly known as:  MICROGESTIN 1/20   Dose:  1 tablet   Quantity:  63 tablet        Take 1 tablet by mouth daily   Refills:  0                Information about OPIOIDS     PRESCRIPTION OPIOIDS: WHAT YOU NEED TO KNOW   We gave you an opioid (narcotic) pain medicine. It is  important to manage your pain, but opioids are not always the best choice. You should first try all the other options your care team gave you. Take this medicine for as short a time (and as few doses) as possible.     These medicines have risks:    DO NOT drive when on new or higher doses of pain medicine. These medicines can affect your alertness and reaction times, and you could be arrested for driving under the influence (DUI). If you need to use opioids long-term, talk to your care team about driving.    DO NOT operate heave machinery    DO NOT do any other dangerous activities while taking these medicines.     DO NOT drink any alcohol while taking these medicines.      If the opioid prescribed includes acetaminophen, DO NOT take with any other medicines that contain acetaminophen. Read all labels carefully. Look for the word  acetaminophen  or  Tylenol.  Ask your pharmacist if you have questions or are unsure.    You can get addicted to pain medicines, especially if you have a history of addiction (chemical, alcohol or substance dependence). Talk to your care team about ways to reduce this risk.    Store your pills in a secure place, locked if possible. We will not replace any lost or stolen medicine. If you don t finish your medicine, please throw away (dispose) as directed by your pharmacist. The [place] [employer]ution [employer] Agency has more information about safe disposal: https://www.pca.[facility].[place]/living-green/managing-unwanted-medications.     All opioids tend to cause constipation. Drink plenty of water and eat foods that have a lot of fiber, such as fruits, vegetables, prune juice, apple juice and high-fiber cereal. Take a laxative (Miralax, milk of magnesia, Colace, Senna) if you don t move your bowels at least every other day.         Prescriptions were sent or printed at these locations (1 Prescription)                   Other Prescriptions                Printed at Department/Unit printer (1 of 1)          oxyCODONE (ROXICODONE) 5 MG/5ML solution                Procedures and tests performed during your visit     Beta strep group A culture    CBC with platelets differential    Comprehensive metabolic panel    Rapid strep screen      Orders Needing Specimen Collection     None      Pending Results     Date and Time Order Name Status Description    7/14/2018 2258 Beta strep group A culture In process             Pending Culture Results     Date and Time Order Name Status Description    7/14/2018 2258 Beta strep group A culture In process             Pending Results Instructions     If you had any lab results that were not finalized at the time of your Discharge, you can call the ED Lab Result RN at [phone]. You will be contacted by this team for any positive Lab results or changes in treatment. The nurses are available 7 days a week from 10A to 6:30P.  You can leave a message 24 hours per day and they will return your call.        Thank you for choosing [facility]       Thank you for choosing [facility] for your care. Our goal is always to provide you with excellent care. Hearing back from our patients is one way we can continue to improve our services. Please take a few minutes to complete the written survey that you may receive in the mail after you visit with us. Thank you!        "[employer]hart Information     [employer] gives you secure access to your electronic health record. If you see a primary care provider, you can also send messages to your care team and make appointments. If you have questions, please call your primary care clinic.  If you do not have a primary care provider, please call [phone] and they will assist you.        Care EveryWhere ID     This is your Care EveryWhere ID. This could be used by other organizations to access your [facility] medical records  [id number]        Equal Access to Services     DALI SAWYER AH: tray Reyna qaybta kaalmada adeegyada, waxay  claudia gordon ah. So [facility] [phone].    ATENCIÓN: Si habla español, tiene a quinn disposición servicios gratuitos de asistencia lingüística. Llame al [phone].    We comply with applicable federal civil rights laws and [place] laws. We do not discriminate on the basis of race, color, national origin, age, disability, sex, sexual orientation, or gender identity.            After Visit Summary       This is your record. Keep this with you and show to your community pharmacist(s) and doctor(s) at your next visit.

## 2018-07-15 NOTE — ED PROVIDER NOTES
"  History     Chief Complaint   Patient presents with     Pharyngitis     was diagnosed with MONO and acute tonsilitis on Tuesday, worse at this time, told by md to be evaluated     HPI  Janey Bain is an otherwise healthy 17 year old female who presents to the Emergency Department due to throat swelling and fatigue. Patient reports that she was recently diagnosed with acute tonsillitis and mononucleosis on Tuesday (7/10). Patient's mom reports that when she was seen, she was tested for strep throat which was negative. She was given a single dose of steroids without improvement. Since that time, the throat swelling has worsened and she has had difficulty breathing and tolerate her secretions. Patient's neighbor is an ENT and referred her to the ED for further evaluation. Here, patient also reports symptoms including vomiting, chills, and headache. She denies abdominal pain or known fevers. Patient states that these symptoms began approximately one and a half weeks ago. She denies the chance of pregnancy. She does not have a history of diabetes and is not immunosuppressed. Patient's mom also denies any known allergies.      No current facility-administered medications for this encounter.      Current Outpatient Prescriptions   Medication     norethindrone-ethinyl estradiol (MICROGESTIN 1/20) 1-20 MG-MCG per tablet     oxyCODONE (ROXICODONE) 5 MG/5ML solution     bisacodyl (DULCOLAX) 10 MG Suppository     dexamethasone (DECADRON) 10 MG/ML injection     dexamethasone (DECADRON) 2 MG tablet     Past Medical History:   Diagnosis Date     Pneumonia 2012    hospitalized       Past Surgical History:   Procedure Laterality Date     NO HISTORY OF SURGERY         Family History   Problem Relation Age of Onset     Breast Cancer Mother      early 50's; \"negative genetic testing\"     Lymphoma Maternal Grandmother      Colon Cancer Paternal Grandmother      Coronary Artery Disease Early Onset Paternal Grandfather      " Cerebrovascular Disease Paternal Grandfather        Social History   Substance Use Topics     Smoking status: Never Smoker     Smokeless tobacco: Never Used      Comment: nonsmoking home     Alcohol use No      Comment: none      Allergies   Allergen Reactions     Seasonal Allergies      I have reviewed the Medications, Allergies, Past Medical and Surgical History, and Social History in the Epic system.    Review of Systems   Constitutional: Positive for chills and fatigue. Negative for fever.   HENT: Positive for sore throat and trouble swallowing.    Respiratory: Positive for shortness of breath.    Gastrointestinal: Negative for abdominal pain.   Neurological: Positive for headaches.   All other systems reviewed and are negative.      Physical Exam   BP: 117/69  Pulse: 110  Heart Rate: 93  Temp: 97.4  F (36.3  C)  Resp: 16  Weight: 50.8 kg (112 lb)  SpO2: 97 %      Physical Exam   Constitutional: She is oriented to person, place, and time. She appears well-developed and well-nourished. No distress.   Appears fatigued but not toxic.  Tolerates her saliva well.    HENT:   Head: Normocephalic and atraumatic.   Right Ear: External ear normal.   Left Ear: External ear normal.   Nose: Nose normal.   Mouth/Throat: Oropharyngeal exudate present.   Neck: Normal range of motion. Neck supple.   Cardiovascular: Normal rate, regular rhythm and normal heart sounds.    Pulmonary/Chest: Effort normal and breath sounds normal.   Abdominal: Soft. Bowel sounds are normal. She exhibits no distension and no mass. There is no tenderness. There is no rebound and no guarding.   Musculoskeletal: Normal range of motion.   Lymphadenopathy:     She has cervical adenopathy.   Neurological: She is alert and oriented to person, place, and time.   Skin: Skin is warm and dry. She is not diaphoretic.   Psychiatric: She has a normal mood and affect.   Nursing note and vitals reviewed.      ED Course   7:27 PM  The patient was seen and examined by  Umu Casiano MD in Room 9.   ED Course     Procedures         No results found for this or any previous visit (from the past 24 hour(s)).     Labs Ordered and Resulted from Time of ED Arrival Up to the Time of Departure from the ED   CBC WITH PLATELETS DIFFERENTIAL - Abnormal; Notable for the following:        Result Value    WBC 11.5 (*)     Absolute Lymphocytes 6.6 (*)     All other components within normal limits   COMPREHENSIVE METABOLIC PANEL - Abnormal; Notable for the following:     Glucose 117 (*)     Albumin 3.3 (*)     Alkaline Phosphatase 208 (*)      (*)      (*)     All other components within normal limits   RAPID STREP SCREEN            Assessments & Plan (with Medical Decision Making)   The patient was recently diagnosed with mono and says she is having worsening throat swelling and feels it is difficult to swallow her saliva.  She has no airway compromise. She has not been eating well.  An IV was placed and labs sent.  She was given NS 3 L  IV, zofran, IV decadron IV and oxycodone.  She felt much better after this treatment and felt well enough to go home.  Labs show elevated LFTs.  We discussed this results, likely due to her mono.  We discussed not using tylenol and having this rechecked in the future. She was discharged home with close follow. She appeared well at discharge.     I have reviewed the nursing notes.    I have reviewed the findings, diagnosis, plan and need for follow up with the patient.    Discharge Medication List as of 7/14/2018 11:39 PM      START taking these medications    Details   oxyCODONE (ROXICODONE) 5 MG/5ML solution Take 5 mLs (5 mg) by mouth every 4 hours as needed for pain, Disp-150 mL, R-0, Local Print             Final diagnoses:   Mononucleosis, infectious, with hepatitis   I, Dedra Alvarez, am serving as a trained medical scribe to document services personally performed by Umu Casiano MD, based on the provider's statements to me.   I, Umu Casiano,  MD, was physically present and have reviewed and verified the accuracy of this note documented by Dedra Alvarez.    7/14/2018   [facility], [place], EMERGENCY DEPARTMENT     Umu Casiano MD  08/09/18 0899

## 2018-07-17 LAB
BACTERIA SPEC CULT: NORMAL
SPECIMEN SOURCE: NORMAL

## 2018-07-18 ENCOUNTER — OFFICE VISIT (OUTPATIENT)
Dept: INTERNAL MEDICINE | Facility: CLINIC | Age: 18
End: 2018-07-18
Payer: COMMERCIAL

## 2018-07-18 VITALS
TEMPERATURE: 98.5 F | BODY MASS INDEX: 19.38 KG/M2 | WEIGHT: 109.4 LBS | DIASTOLIC BLOOD PRESSURE: 75 MMHG | SYSTOLIC BLOOD PRESSURE: 123 MMHG | RESPIRATION RATE: 16 BRPM | HEART RATE: 63 BPM | OXYGEN SATURATION: 100 %

## 2018-07-18 DIAGNOSIS — K59.00 CONSTIPATION, UNSPECIFIED CONSTIPATION TYPE: ICD-10-CM

## 2018-07-18 DIAGNOSIS — B27.09 GAMMAHERPESVIRAL MONONUCLEOSIS WITH OTHER COMPLICATIONS: Primary | ICD-10-CM

## 2018-07-18 RX ORDER — BISACODYL 10 MG
10 SUPPOSITORY, RECTAL RECTAL DAILY PRN
Qty: 10 SUPPOSITORY | Refills: 1 | Status: SHIPPED | OUTPATIENT
Start: 2018-07-18 | End: 2019-08-12

## 2018-07-18 RX ORDER — DEXAMETHASONE 2 MG/1
4 TABLET ORAL 2 TIMES DAILY WITH MEALS
Qty: 20 TABLET | Refills: 0 | Status: SHIPPED | OUTPATIENT
Start: 2018-07-18 | End: 2019-08-12

## 2018-07-18 ASSESSMENT — PAIN SCALES - GENERAL: PAINLEVEL: SEVERE PAIN (7)

## 2018-07-18 NOTE — PATIENT INSTRUCTIONS
[facility]: [phone]     [facility] Medication Refill Request Information:  * Please contact your pharmacy regarding ANY request for medication refills.  ** [facility] Prescription Fax = [phone]  * Please allow 3 business days for routine medication refills.  * Please allow 5 business days for controlled substance medication refills.     [facility] Test Result notification information:  *You will be notified with in 7-10 days of your appointment day regarding the results of your test.  If you are on MyChart you will be notified as soon as the provider has reviewed the results and signed off on them.

## 2018-07-18 NOTE — NURSING NOTE
Chief Complaint   Patient presents with     Results     patient states she is here to discuss liver tests from Mono and ongoing illness     JIAN HOPKINS at 7:15 AM on 7/18/2018.

## 2018-07-18 NOTE — MR AVS SNAPSHOT
After Visit Summary   7/18/2018    Janey Bain    MRN: [id number]           Patient Information     Date Of Birth          2000        Visit Information        Provider Department      7/18/2018 7:00 AM Ishmael Espinosa MD [facility]        Today's Diagnoses     Constipation, unspecified constipation type    -  1    Gammaherpesviral mononucleosis with other complications          Care Instructions    Primary Care Center: [phone]     [facility] Center Medication Refill Request Information:  * Please contact your pharmacy regarding ANY request for medication refills.  ** [facility] Prescription Fax = [phone]  * Please allow 3 business days for routine medication refills.  * Please allow 5 business days for controlled substance medication refills.     [facility] Center Test Result notification information:  *You will be notified with in 7-10 days of your appointment day regarding the results of your test.  If you are on MyChart you will be notified as soon as the provider has reviewed the results and signed off on them.            Follow-ups after your visit        Future tests that were ordered for you today     Open Future Orders        Priority Expected Expires Ordered    ALT Routine 7/18/2018 7/18/2019 7/18/2018    AST Routine 7/18/2018 7/18/2019 7/18/2018    Alkaline phosphatase Routine 7/18/2018 7/18/2019 7/18/2018    CBC with platelets differential Routine 7/18/2018 8/1/2018 7/18/2018            Who to contact     Please call your clinic at [phone] to:    Ask questions about your health    Make or cancel appointments    Discuss your medicines    Learn about your test results    Speak to your doctor            Additional Information About Your Visit        MyChart Information     [employer] gives you secure access to your electronic health record. If you see a primary care provider, you can also send messages to your care team and make appointments. If you  have questions, please call your primary care clinic.  If you do not have a primary care provider, please call [phone] and they will assist you.      [email or web address] is an electronic gateway that provides easy, online access to your medical records. With [email or web address], you can request a clinic appointment, read your test results, renew a prescription or communicate with your care team.     To access your existing account, please contact your [facility] or call [phone] for assistance.        Care EveryWhere ID     This is your Care EveryWhere ID. This could be used by other organizations to access your [place] medical records  [id number]        Your Vitals Were     Pulse Temperature Respirations Last Period Pulse Oximetry BMI (Body Mass Index)    63 98.5  F (36.9  C) 16 07/05/2018 100% 19.38 kg/m2       Blood Pressure from Last 3 Encounters:   07/18/18 123/75   07/14/18 113/71   06/19/18 120/74    Weight from Last 3 Encounters:   07/18/18 49.6 kg (109 lb 6.4 oz) (14 %)*   07/14/18 50.8 kg (112 lb) (18 %)*   07/10/18 50.1 kg (110 lb 6.4 oz) (16 %)*     * Growth percentiles are based on [facility] 2-20 Years data.                 Today's Medication Changes          These changes are accurate as of 7/18/18  7:55 AM.  If you have any questions, ask your nurse or doctor.               Start taking these medicines.        Dose/Directions    bisacodyl 10 MG Suppository   Commonly known as:  DULCOLAX   Used for:  Constipation, unspecified constipation type   Started by:  Ishmael Espinosa MD        Dose:  10 mg   Place 1 suppository (10 mg) rectally daily as needed for constipation   Quantity:  10 suppository   Refills:  1       dexamethasone 2 MG tablet   Commonly known as:  DECADRON   Used for:  Gammaherpesviral mononucleosis with other complications   Started by:  Ishmael Espinosa MD        Dose:  4 mg   Take 2 tablets (4 mg) by mouth 2 times daily (with meals)   Quantity:  20 tablet    Refills:  0            Where to get your medicines      These medications were sent to [id number] - [place], [place] - [address]  [address], [facility] [address]     Phone:  [phone]     bisacodyl 10 MG Suppository    dexamethasone 2 MG tablet                Primary Care Provider Office Phone # Fax #    [facility] [place] [phone]       [address] [address] [address]        Equal Access to Services     DALI SAWYER : Hadii aad ku hadasho Soomaali, waaxda luqadaha, qaybta kaalmada adeegyada, waxay idiin hayaan adeeg kharamalu gordon . [facility] [phone].    ATENCIÓN: Si habla español, tiene a quinn disposición servicios gratuitos de asistencia lingüística. [facility] [phone].    We comply with applicable federal civil rights laws and [place] laws. We do not discriminate on the basis of race, color, national origin, age, disability, sex, sexual orientation, or gender identity.            Thank you!     Thank you for choosing [facility]  for your care. Our goal is always to provide you with excellent care. Hearing back from our patients is one way we can continue to improve our services. Please take a few minutes to complete the written survey that you may receive in the mail after your visit with us. Thank you!             Your Updated Medication List - Protect others around you: Learn how to safely use, store and throw away your medicines at www.disposemymeds.org.          This list is accurate as of 7/18/18  7:55 AM.  Always use your most recent med list.                   Brand Name Dispense Instructions for use Diagnosis    bisacodyl 10 MG Suppository    DULCOLAX    10 suppository    Place 1 suppository (10 mg) rectally daily as needed for constipation    Constipation, unspecified constipation type       dexamethasone 10 MG/ML injection    DECADRON    0.6 mL    Inject 0.6 mLs (6 mg) into the vein once for 1 dose    Acute  tonsillitis due to infectious mononucleosis       dexamethasone 2 MG tablet    DECADRON    20 tablet    Take 2 tablets (4 mg) by mouth 2 times daily (with meals)    Gammaherpesviral mononucleosis with other complications       norethindrone-ethinyl estradiol 1-20 MG-MCG per tablet    MICROGESTIN 1/20    63 tablet    Take 1 tablet by mouth daily    Encounter for initial prescription of contraceptive pills, Irregular menses       oxyCODONE 5 MG/5ML solution    ROXICODONE    150 mL    Take 5 mLs (5 mg) by mouth every 4 hours as needed for pain

## 2018-07-19 NOTE — PROGRESS NOTES
SUBJECTIVE: Chief complaint: Follow-up of infectious mononucleosis with hepatitis and tonsillitis.  This 17-year-old woman presents in the company of her mother complaining of throat discomfort.  She was diagnosed 8 days ago with infectious mononucleosis with tonsillitis, for which she was treated at an urgent care facility with an injection of dexamethasone and advised to rest and arrange clinic follow-up.  Four days later, she was seen in an emergency department for progressive difficulty with swallowing; she was noted to have elevated liver enzymes and was treated with intravenous fluids and a steroid injection; details of the visit are not available.  She was discharged with a prescription for oxycodone and apparently advised to use ibuprofen as needed.  She reports continued difficulties with swallowing, without dyspnea or stridor; she has noted ear fullness and discomfort bilaterally, without ear or sinus discharge, current fever, or abdominal pain.  She notes slight nausea and has noted significant constipation, with no bowel movements over the past 5 days.  She reports unchanged mild cervical lymphadenopathy with tenderness.  She has been using oxycodone 3-4 times per day along with ibuprofen, 200 mg every 4-6 hours as needed.  Currently her throat pain is most bothersome, followed by her ear discomfort and constipation.    Past Medical History: Reviewed and updated in patient health profile.     Adverse Drug Reactions: Reviewed and updated in patient health profile.     Current Medications: Reviewed and updated in patient health profile.     OBJECTIVE:     Vital signs: Reviewed in patient health profile.  Afebrile and hemodynamically stable.  General: Alert, neatly dressed and groomed, in no acute distress.  HEENT: Atraumatic and normocephalic. Eyelids, pupils, and conjunctivae appeared normal. Lips, teeth and gums appeared normal. Oropharynx showed moist mucous membranes, with enlarged, erythematous  tonsils, without exudate or compromised airway.  Ears showed clear TMs bilaterally without evidence of erythema, effusion, or external canal exudate or cerumen.  Neck: Supple, without thyromegaly, mass, or bruit.  Mild cervical lymphadenopathy.  Back: No spinal or costovertebral angle tenderness.  Chest: Clear to auscultation and percussion. Normal respiratory effort.  Cardiovascular: No jugular venous distention. Regular rate and rhythm, normal S1, S2 without murmur.  Abdomen: Bowel sounds positive; soft, nontender, without rebound, guarding, hepatosplenomegaly or mass.  Extremities: No cyanosis or edema.     ASSESSMENT:     1.  Infectious mononucleosis.  Associate with significant tonsillitis and elevated liver enzyme levels, without dyspnea, stridor, or compromised airway and examination.  She is followed by continued significant throat discomfort, especially with swallowing, to the point that her oral intake has been compromised.  For this reason, she will be treated with dexamethasone, 4 mg twice a day for 5 days.  I recommended use of ibuprofen, 400-800 mg 3 times a day as needed for the next 2-3 weeks, with instructions to use with food and copious water intake.  I recommended repeat CBC and liver enzyme levels to guide management.  She agrees to return in one week if symptoms have not improved and to call or return if symptoms progress; she was advised of the need for emergency evaluation in the event of dyspnea stridor, or other suggestion of airway compromise.  She was advised to gradually return to limited levels of activity as tolerated to help cope with her constipation.  We discussed the potential for splenomegaly and the risk of splenic rupture with trauma; she was advised to avoid regular exercise and all sports with risk for trauma for 2-3 weeks.     2.  Constipation.  Presumably related to use of oxycodone coupled with inactivity and reduce fluid and fiber intake.  She was advised to wean off of  oxycodone over the next 2-3 days, reducing the dose by one half, then gradually extended intervals between doses until eliminating medication entirely.  She will use MiraLAX, 17 g daily as needed and Dulcolax suppositories 1 NV daily as needed until constipation resolves.  She agrees to attempt to increase her fluid and fiber intake and to gradually increase her activity as tolerated.  She agrees to call if constipation has not responded over the next 48 hours or in the event of abdominal pain, vomiting, or fever.    PLAN:  See above.    Total time was 25 minutes.  Counseling time was 15 minutes.  We discussed potential causes of her symptoms, complications for which to monitor and seek immediate medical care, and plans for further evaluation, treatment, and follow-up.     Please note that the above medical document was created with use of speech recognition software and may contain typographical errors.

## 2018-07-26 DIAGNOSIS — B27.09 GAMMAHERPESVIRAL MONONUCLEOSIS WITH OTHER COMPLICATIONS: ICD-10-CM

## 2018-07-26 LAB
ALP SERPL-CCNC: 120 U/L (ref 40–150)
ALT SERPL W P-5'-P-CCNC: 79 U/L (ref 0–50)
AST SERPL W P-5'-P-CCNC: 28 U/L (ref 0–35)
BASOPHILS # BLD AUTO: 0.1 10E9/L (ref 0–0.2)
BASOPHILS NFR BLD AUTO: 0.6 %
DIFFERENTIAL METHOD BLD: ABNORMAL
EOSINOPHIL # BLD AUTO: 0.2 10E9/L (ref 0–0.7)
EOSINOPHIL NFR BLD AUTO: 2.3 %
ERYTHROCYTE [DISTWIDTH] IN BLOOD BY AUTOMATED COUNT: 12.7 % (ref 10–15)
HCT VFR BLD AUTO: 41.8 % (ref 35–47)
HGB BLD-MCNC: 13.4 G/DL (ref 11.7–15.7)
IMM GRANULOCYTES # BLD: 0 10E9/L (ref 0–0.4)
IMM GRANULOCYTES NFR BLD: 0.3 %
LYMPHOCYTES # BLD AUTO: 3.3 10E9/L (ref 0.8–5.3)
LYMPHOCYTES NFR BLD AUTO: 38.2 %
MCH RBC QN AUTO: 28.2 PG (ref 26.5–33)
MCHC RBC AUTO-ENTMCNC: 32.1 G/DL (ref 31.5–36.5)
MCV RBC AUTO: 88 FL (ref 78–100)
MONOCYTES # BLD AUTO: 0.6 10E9/L (ref 0–1.3)
MONOCYTES NFR BLD AUTO: 6.7 %
NEUTROPHILS # BLD AUTO: 4.5 10E9/L (ref 1.6–8.3)
NEUTROPHILS NFR BLD AUTO: 51.9 %
NRBC # BLD AUTO: 0 10*3/UL
NRBC BLD AUTO-RTO: 0 /100
PLATELET # BLD AUTO: 457 10E9/L (ref 150–450)
RBC # BLD AUTO: 4.75 10E12/L (ref 3.8–5.2)
WBC # BLD AUTO: 8.7 10E9/L (ref 4–11)

## 2018-08-13 ENCOUNTER — MYC MEDICAL ADVICE (OUTPATIENT)
Dept: OBGYN | Facility: CLINIC | Age: 18
End: 2018-08-13

## 2018-08-13 DIAGNOSIS — Z30.011 ENCOUNTER FOR INITIAL PRESCRIPTION OF CONTRACEPTIVE PILLS: ICD-10-CM

## 2018-08-13 DIAGNOSIS — N92.6 IRREGULAR MENSES: ICD-10-CM

## 2018-08-16 DIAGNOSIS — Z30.41 SURVEILLANCE OF CONTRACEPTIVE PILL: Primary | ICD-10-CM

## 2018-08-20 DIAGNOSIS — Z30.41 SURVEILLANCE OF CONTRACEPTIVE PILL: ICD-10-CM

## 2018-08-20 LAB
ALT SERPL W P-5'-P-CCNC: 25 U/L (ref 0–50)
AST SERPL W P-5'-P-CCNC: 17 U/L (ref 0–35)

## 2018-08-20 RX ORDER — NORETHINDRONE ACETATE AND ETHINYL ESTRADIOL .02; 1 MG/1; MG/1
1 TABLET ORAL DAILY
Qty: 84 TABLET | Refills: 3 | Status: SHIPPED | OUTPATIENT
Start: 2018-08-20 | End: 2018-12-26

## 2018-12-26 ENCOUNTER — MYC REFILL (OUTPATIENT)
Dept: OBGYN | Facility: CLINIC | Age: 18
End: 2018-12-26

## 2018-12-26 DIAGNOSIS — N92.6 IRREGULAR MENSES: ICD-10-CM

## 2018-12-26 DIAGNOSIS — Z30.011 ENCOUNTER FOR INITIAL PRESCRIPTION OF CONTRACEPTIVE PILLS: ICD-10-CM

## 2018-12-30 RX ORDER — NORETHINDRONE ACETATE AND ETHINYL ESTRADIOL .02; 1 MG/1; MG/1
1 TABLET ORAL DAILY
Qty: 84 TABLET | Refills: 3 | Status: SHIPPED | OUTPATIENT
Start: 2018-12-30 | End: 2019-03-16

## 2019-03-16 ENCOUNTER — MYC REFILL (OUTPATIENT)
Dept: OBGYN | Facility: CLINIC | Age: 19
End: 2019-03-16

## 2019-03-16 DIAGNOSIS — N92.6 IRREGULAR MENSES: ICD-10-CM

## 2019-03-16 DIAGNOSIS — Z30.011 ENCOUNTER FOR INITIAL PRESCRIPTION OF CONTRACEPTIVE PILLS: ICD-10-CM

## 2019-03-20 ENCOUNTER — MYC REFILL (OUTPATIENT)
Dept: OBGYN | Facility: CLINIC | Age: 19
End: 2019-03-20

## 2019-03-20 DIAGNOSIS — N92.6 IRREGULAR MENSES: ICD-10-CM

## 2019-03-20 DIAGNOSIS — Z30.011 ENCOUNTER FOR INITIAL PRESCRIPTION OF CONTRACEPTIVE PILLS: ICD-10-CM

## 2019-03-20 RX ORDER — NORETHINDRONE ACETATE AND ETHINYL ESTRADIOL .02; 1 MG/1; MG/1
1 TABLET ORAL DAILY
Qty: 84 TABLET | Refills: 3 | Status: CANCELLED | OUTPATIENT
Start: 2019-03-20

## 2019-03-21 RX ORDER — NORETHINDRONE ACETATE AND ETHINYL ESTRADIOL .02; 1 MG/1; MG/1
1 TABLET ORAL DAILY
Qty: 84 TABLET | Refills: 3 | Status: SHIPPED | OUTPATIENT
Start: 2019-03-21 | End: 2019-07-29

## 2019-03-21 NOTE — TELEPHONE ENCOUNTER
Received refill request for OCP.  Last in clinic June 2018.  Refill sent per protocol. Patient notified via [employer].

## 2019-07-29 ENCOUNTER — MYC REFILL (OUTPATIENT)
Dept: OBGYN | Facility: CLINIC | Age: 19
End: 2019-07-29

## 2019-07-29 DIAGNOSIS — Z30.011 ENCOUNTER FOR INITIAL PRESCRIPTION OF CONTRACEPTIVE PILLS: ICD-10-CM

## 2019-07-29 DIAGNOSIS — N92.6 IRREGULAR MENSES: ICD-10-CM

## 2019-08-02 RX ORDER — NORETHINDRONE ACETATE AND ETHINYL ESTRADIOL .02; 1 MG/1; MG/1
1 TABLET ORAL DAILY
Qty: 84 TABLET | Refills: 3 | Status: SHIPPED | OUTPATIENT
Start: 2019-08-02 | End: 2020-06-06

## 2019-08-12 ENCOUNTER — OFFICE VISIT (OUTPATIENT)
Dept: FAMILY MEDICINE | Facility: CLINIC | Age: 19
End: 2019-08-12
Attending: FAMILY MEDICINE
Payer: COMMERCIAL

## 2019-08-12 VITALS
DIASTOLIC BLOOD PRESSURE: 72 MMHG | HEIGHT: 64 IN | SYSTOLIC BLOOD PRESSURE: 115 MMHG | BODY MASS INDEX: 19.43 KG/M2 | WEIGHT: 113.8 LBS | HEART RATE: 82 BPM

## 2019-08-12 DIAGNOSIS — J31.2 CHRONIC SORE THROAT: Primary | ICD-10-CM

## 2019-08-12 PROCEDURE — G0463 HOSPITAL OUTPT CLINIC VISIT: HCPCS | Mod: ZF

## 2019-08-12 ASSESSMENT — ANXIETY QUESTIONNAIRES
3. WORRYING TOO MUCH ABOUT DIFFERENT THINGS: NOT AT ALL
2. NOT BEING ABLE TO STOP OR CONTROL WORRYING: NOT AT ALL
5. BEING SO RESTLESS THAT IT IS HARD TO SIT STILL: NOT AT ALL
6. BECOMING EASILY ANNOYED OR IRRITABLE: NOT AT ALL
7. FEELING AFRAID AS IF SOMETHING AWFUL MIGHT HAPPEN: NOT AT ALL
1. FEELING NERVOUS, ANXIOUS, OR ON EDGE: NOT AT ALL
GAD7 TOTAL SCORE: 0

## 2019-08-12 ASSESSMENT — MIFFLIN-ST. JEOR: SCORE: 1263.25

## 2019-08-12 ASSESSMENT — PATIENT HEALTH QUESTIONNAIRE - PHQ9
SUM OF ALL RESPONSES TO PHQ QUESTIONS 1-9: 0
5. POOR APPETITE OR OVEREATING: NOT AT ALL

## 2019-08-12 ASSESSMENT — PAIN SCALES - GENERAL: PAINLEVEL: NO PAIN (0)

## 2019-08-12 NOTE — NURSING NOTE
Chief Complaint   Patient presents with     Annual Visit     Pt c/o swollen tonsils and frequent sore throats.

## 2019-08-12 NOTE — LETTER
8/12/2019       RE: Janey Bain  [address] [address]     Dear Colleague,    Thank you for referring your patient, Janey Bain, to the [facility]. Please see a copy of my visit note below.            Primary Care Center  Established Patient visit    Name: Janey Bain MRN: [id number]     Age: 18 year old           Chief Complaint:    YOB: 2000       CC: swollen tonsils and sore throat          HPI and ROS:   HPI:  17 yo female who had mono last year.  Tonsils have persisted to be large - She is getting sore throats a few times/wk. No documented strept.  Sore throat may last 1 day. No fever, no chills, no neck pain.  Does get earache with it associated with side of enlarged tonsil. It  hurts to swallow, no drooling - usually lasts only a day.  Using salt water gargling. Her throat is not sore currently.    She is returning to college in 1 wk.  She is on BC pills and had gyn appt last 6/2018.         ROS:  A 14-point Review of Systems was performed and is negative other than mentioned in HPI         Past Medical History:     Past Medical History:   Diagnosis Date     Mononucleosis      Pneumonia 2012    hospitalized             Past Surgical History:      Past Surgical History:   Procedure Laterality Date     NO HISTORY OF SURGERY                 Immunizations:     Immunization History   Administered Date(s) Administered     DT (PEDS <7y) 11/07/2011     DTAP (<7y) 2000, 02/02/1999, 04/13/1999, 03/07/2000, 08/27/2003, 11/07/2011     DTAP-IPV, <7Y 1998, 02/02/1999, 03/07/2000, 08/27/2003     HEPA 03/09/2009, 10/12/2009     HPV 06/25/2014, 11/26/2014, 07/13/2016     HepB [birth year], 04/13/1999     Hib (PRP-T) 2000, 02/02/1999, 04/13/1999, 03/07/2000     Influenza (IIV3) PF 01/23/2006, 12/21/2007, 11/05/2010, 11/07/2011, 12/17/2012, 12/20/2013, 11/26/2014     MMR 10/11/1999,  "08/27/2003     Meningococcal (Menactra ) 11/26/2014, 08/09/2017     TDAP Vaccine (Boostrix) 06/25/2014     Varicella 10/11/1999, 03/10/2009             Allergies:     Allergies   Allergen Reactions     Seasonal Allergies              Medications:     Current Outpatient Medications on File Prior to Visit:  norethindrone-ethinyl estradiol (MICROGESTIN 1/20) 1-20 MG-MCG tablet Take 1 tablet by mouth daily     No current facility-administered medications on file prior to visit.          Exam:   /72   Pulse 82   Ht 1.613 m (5' 3.5\")   Wt 51.6 kg (113 lb 12.8 oz)   BMI 19.84 kg/m       General: NAD, alert and conversational  Neck: supple no tenderness or nodes  OP tonsils mildly enlarged with notable crypts  Ears: good light reflex bilaterally, no inflammation   CV: Normal S1,S2 with RRR no murmurs, rubs or gallops  Resp: Lungs CTA bilaterally with no wheezes or crackles    Skin: No concerning lesions or rashes        Labs:            Assessment and Plan:     Assessment:   This is a 18-year-old female with history of mono last year who has been having intermittent sore throat.    1.  Chronic sore throat.  This most likely is from bacteria in the tonsillar crypts causing intermittent inflammation.  The tonsils are mildly enlarged.  Would have her see ENT to consider removal.  However she is going back to school.  In the meantime encouraged her to use Cepacol or salt water gargles.  2.  Birth control.  She has enough of a prescription.  At some point she should have an annual exam.  Encouraged her to make an appointment during 1 of her breaks.    Maricel Ho MD, PhD    RTC: Dec for annual     "

## 2019-08-12 NOTE — PROGRESS NOTES
[facility]  Established Patient visit    Name: Janey Bain MRN: [id number]     Age: 18 year old           Chief Complaint:    YOB: 2000       CC: swollen tonsils and sore throat          HPI and ROS:   HPI:  19 yo female who had mono last year.  Tonsils have persisted to be large - She is getting sore throats a few times/wk. No documented strept.  Sore throat may last 1 day. No fever, no chills, no neck pain.  Does get earache with it associated with side of enlarged tonsil. It  hurts to swallow, no drooling - usually lasts only a day.  Using salt water gargling. Her throat is not sore currently.    She is returning to college in 1 wk.  She is on BC pills and had gyn appt last 6/20218.             ROS:  A 14-point Review of Systems was performed and is negative other than mentioned in HPI         Past Medical History:     Past Medical History:   Diagnosis Date     Mononucleosis      Pneumonia 2012    hospitalized             Past Surgical History:      Past Surgical History:   Procedure Laterality Date     NO HISTORY OF SURGERY                 Immunizations:     Immunization History   Administered Date(s) Administered     DT (PEDS <7y) 11/07/2011     DTAP (<7y) 1998, 02/02/1999, 04/13/1999, 03/07/2000, 08/27/2003, 11/07/2011     DTAP-IPV, <7Y 1998, 02/02/1999, 03/07/2000, 08/27/2003     HEPA 03/09/2009, 10/12/2009     HPV 06/25/2014, 11/26/2014, 07/13/2016     HepB 2000, 2000, 04/13/1999     Hib (PRP-T) 2000, 02/02/1999, 04/13/1999, 03/07/2000     Influenza (IIV3) PF 01/23/2006, 12/21/2007, 11/05/2010, 11/07/2011, 12/17/2012, 12/20/2013, 11/26/2014     MMR 10/11/1999, 08/27/2003     Meningococcal (Menactra ) 11/26/2014, 08/09/2017     TDAP Vaccine (Boostrix) 06/25/2014     Varicella 10/11/1999, 03/10/2009             Allergies:     Allergies   Allergen Reactions     Seasonal Allergies              Medications:     Current Outpatient Medications on  "File Prior to Visit:  norethindrone-ethinyl estradiol (MICROGESTIN 1/20) 1-20 MG-MCG tablet Take 1 tablet by mouth daily     No current facility-administered medications on file prior to visit.          Exam:   /72   Pulse 82   Ht 1.613 m (5' 3.5\")   Wt 51.6 kg (113 lb 12.8 oz)   BMI 19.84 kg/m      General: NAD, alert and conversational  Neck: supple no tenderness or nodes  OP tonsils mildly enlarged with notable crypts  Ears: good light reflex bilaterally, no inflammation   CV: Normal S1,S2 with RRR no murmurs, rubs or gallops  Resp: Lungs CTA bilaterally with no wheezes or crackles    Skin: No concerning lesions or rashes        Labs:            Assessment and Plan:     Assessment:   This is a 18-year-old female with history of mono last year who has been having intermittent sore throat.    1.  Chronic sore throat.  This most likely is from bacteria in the tonsillar crypts causing intermittent inflammation.  The tonsils are mildly enlarged.  Would have her see ENT to consider removal.  However she is going back to school.  In the meantime encouraged her to use Cepacol or salt water gargles.  2.  Birth control.  She has enough of a prescription.  At some point she should have an annual exam.  Encouraged her to make an appointment during 1 of her breaks.    Maricel Ho MD, PhD              RTC: Dec for annual              "

## 2019-08-13 ASSESSMENT — ANXIETY QUESTIONNAIRES: GAD7 TOTAL SCORE: 0

## 2019-10-05 ENCOUNTER — HEALTH MAINTENANCE LETTER (OUTPATIENT)
Age: 19
End: 2019-10-05

## 2019-11-27 ENCOUNTER — MYC REFILL (OUTPATIENT)
Dept: OBGYN | Facility: CLINIC | Age: 19
End: 2019-11-27

## 2019-11-27 DIAGNOSIS — N92.6 IRREGULAR MENSES: ICD-10-CM

## 2019-11-27 DIAGNOSIS — Z30.011 ENCOUNTER FOR INITIAL PRESCRIPTION OF CONTRACEPTIVE PILLS: ICD-10-CM

## 2019-11-27 RX ORDER — NORETHINDRONE ACETATE AND ETHINYL ESTRADIOL .02; 1 MG/1; MG/1
1 TABLET ORAL DAILY
Qty: 84 TABLET | Refills: 3 | OUTPATIENT
Start: 2019-11-27

## 2020-02-10 ENCOUNTER — HEALTH MAINTENANCE LETTER (OUTPATIENT)
Age: 20
End: 2020-02-10

## 2020-03-06 ENCOUNTER — MYC REFILL (OUTPATIENT)
Dept: OBGYN | Facility: CLINIC | Age: 20
End: 2020-03-06

## 2020-03-06 DIAGNOSIS — N92.6 IRREGULAR MENSES: ICD-10-CM

## 2020-03-06 DIAGNOSIS — Z30.011 ENCOUNTER FOR INITIAL PRESCRIPTION OF CONTRACEPTIVE PILLS: ICD-10-CM

## 2020-03-06 RX ORDER — NORETHINDRONE ACETATE AND ETHINYL ESTRADIOL .02; 1 MG/1; MG/1
1 TABLET ORAL DAILY
Qty: 84 TABLET | Refills: 3 | Status: CANCELLED | OUTPATIENT
Start: 2020-03-06

## 2020-03-10 ENCOUNTER — MYC REFILL (OUTPATIENT)
Dept: OBGYN | Facility: CLINIC | Age: 20
End: 2020-03-10

## 2020-03-10 DIAGNOSIS — Z30.011 ENCOUNTER FOR INITIAL PRESCRIPTION OF CONTRACEPTIVE PILLS: ICD-10-CM

## 2020-03-10 DIAGNOSIS — N92.6 IRREGULAR MENSES: ICD-10-CM

## 2020-03-10 RX ORDER — NORETHINDRONE ACETATE AND ETHINYL ESTRADIOL .02; 1 MG/1; MG/1
1 TABLET ORAL DAILY
Qty: 84 TABLET | Refills: 3 | Status: CANCELLED | OUTPATIENT
Start: 2020-03-10

## 2020-06-06 ENCOUNTER — MYC REFILL (OUTPATIENT)
Dept: OBGYN | Facility: CLINIC | Age: 20
End: 2020-06-06

## 2020-06-06 DIAGNOSIS — N92.6 IRREGULAR MENSES: ICD-10-CM

## 2020-06-06 DIAGNOSIS — Z30.011 ENCOUNTER FOR INITIAL PRESCRIPTION OF CONTRACEPTIVE PILLS: ICD-10-CM

## 2020-06-11 RX ORDER — NORETHINDRONE ACETATE AND ETHINYL ESTRADIOL .02; 1 MG/1; MG/1
1 TABLET ORAL DAILY
Qty: 84 TABLET | Refills: 3 | Status: SHIPPED | OUTPATIENT
Start: 2020-06-11 | End: 2020-08-24

## 2020-08-24 ENCOUNTER — OFFICE VISIT (OUTPATIENT)
Dept: FAMILY MEDICINE | Facility: CLINIC | Age: 20
End: 2020-08-24
Attending: FAMILY MEDICINE
Payer: COMMERCIAL

## 2020-08-24 VITALS
WEIGHT: 104.9 LBS | SYSTOLIC BLOOD PRESSURE: 113 MMHG | HEART RATE: 99 BPM | HEIGHT: 64 IN | DIASTOLIC BLOOD PRESSURE: 74 MMHG | BODY MASS INDEX: 17.91 KG/M2

## 2020-08-24 DIAGNOSIS — Z00.00 ANNUAL PHYSICAL EXAM: ICD-10-CM

## 2020-08-24 DIAGNOSIS — Z00.00 ANNUAL PHYSICAL EXAM: Primary | ICD-10-CM

## 2020-08-24 DIAGNOSIS — N92.6 IRREGULAR MENSES: ICD-10-CM

## 2020-08-24 DIAGNOSIS — Z30.011 ENCOUNTER FOR INITIAL PRESCRIPTION OF CONTRACEPTIVE PILLS: ICD-10-CM

## 2020-08-24 LAB
CHOLEST SERPL-MCNC: 159 MG/DL
HDLC SERPL-MCNC: 39 MG/DL
LDLC SERPL CALC-MCNC: 97 MG/DL
NONHDLC SERPL-MCNC: 120 MG/DL
TRIGL SERPL-MCNC: 113 MG/DL

## 2020-08-24 PROCEDURE — 36415 COLL VENOUS BLD VENIPUNCTURE: CPT | Performed by: FAMILY MEDICINE

## 2020-08-24 PROCEDURE — G0145 SCR C/V CYTO,THINLAYER,RESCR: HCPCS | Performed by: FAMILY MEDICINE

## 2020-08-24 PROCEDURE — 87591 N.GONORRHOEAE DNA AMP PROB: CPT | Performed by: FAMILY MEDICINE

## 2020-08-24 PROCEDURE — G0463 HOSPITAL OUTPT CLINIC VISIT: HCPCS | Mod: ZF

## 2020-08-24 PROCEDURE — 87491 CHLMYD TRACH DNA AMP PROBE: CPT | Performed by: FAMILY MEDICINE

## 2020-08-24 PROCEDURE — 80061 LIPID PANEL: CPT | Performed by: FAMILY MEDICINE

## 2020-08-24 RX ORDER — NORETHINDRONE ACETATE AND ETHINYL ESTRADIOL .02; 1 MG/1; MG/1
1 TABLET ORAL DAILY
Qty: 84 TABLET | Refills: 3 | Status: SHIPPED | OUTPATIENT
Start: 2020-08-24

## 2020-08-24 SDOH — SOCIAL STABILITY: SOCIAL INSECURITY: WITHIN THE LAST YEAR, HAVE YOU BEEN AFRAID OF YOUR PARTNER OR EX-PARTNER?: NO

## 2020-08-24 SDOH — HEALTH STABILITY: PHYSICAL HEALTH: ON AVERAGE, HOW MANY DAYS PER WEEK DO YOU ENGAGE IN MODERATE TO STRENUOUS EXERCISE (LIKE A BRISK WALK)?: 4 DAYS

## 2020-08-24 SDOH — SOCIAL STABILITY: SOCIAL INSECURITY: WITHIN THE LAST YEAR, HAVE YOU BEEN HUMILIATED OR EMOTIONALLY ABUSED IN OTHER WAYS BY YOUR PARTNER OR EX-PARTNER?: NO

## 2020-08-24 SDOH — SOCIAL STABILITY: SOCIAL INSECURITY
WITHIN THE LAST YEAR, HAVE TO BEEN RAPED OR FORCED TO HAVE ANY KIND OF SEXUAL ACTIVITY BY YOUR PARTNER OR EX-PARTNER?: NO

## 2020-08-24 SDOH — HEALTH STABILITY: PHYSICAL HEALTH: ON AVERAGE, HOW MANY MINUTES DO YOU ENGAGE IN EXERCISE AT THIS LEVEL?: 120 MIN

## 2020-08-24 SDOH — HEALTH STABILITY: MENTAL HEALTH
STRESS IS WHEN SOMEONE FEELS TENSE, NERVOUS, ANXIOUS, OR CAN'T SLEEP AT NIGHT BECAUSE THEIR MIND IS TROUBLED. HOW STRESSED ARE YOU?: TO SOME EXTENT

## 2020-08-24 SDOH — SOCIAL STABILITY: SOCIAL INSECURITY
WITHIN THE LAST YEAR, HAVE YOU BEEN KICKED, HIT, SLAPPED, OR OTHERWISE PHYSICALLY HURT BY YOUR PARTNER OR EX-PARTNER?: NO

## 2020-08-24 ASSESSMENT — ENCOUNTER SYMPTOMS
SINUS CONGESTION: 0
HOARSE VOICE: 0
SMELL DISTURBANCE: 0
TASTE DISTURBANCE: 0
SINUS PAIN: 0
SORE THROAT: 1
NECK MASS: 0
TROUBLE SWALLOWING: 0

## 2020-08-24 ASSESSMENT — PATIENT HEALTH QUESTIONNAIRE - PHQ9: SUM OF ALL RESPONSES TO PHQ QUESTIONS 1-9: 1

## 2020-08-24 ASSESSMENT — MIFFLIN-ST. JEOR: SCORE: 1217.88

## 2020-08-24 ASSESSMENT — ANXIETY QUESTIONNAIRES
4. TROUBLE RELAXING: SEVERAL DAYS
GAD7 TOTAL SCORE: 4
7. FEELING AFRAID AS IF SOMETHING AWFUL MIGHT HAPPEN: NOT AT ALL
5. BEING SO RESTLESS THAT IT IS HARD TO SIT STILL: NOT AT ALL
2. NOT BEING ABLE TO STOP OR CONTROL WORRYING: SEVERAL DAYS
6. BECOMING EASILY ANNOYED OR IRRITABLE: NOT AT ALL
7. FEELING AFRAID AS IF SOMETHING AWFUL MIGHT HAPPEN: NOT AT ALL
GAD7 TOTAL SCORE: 4
1. FEELING NERVOUS, ANXIOUS, OR ON EDGE: SEVERAL DAYS
3. WORRYING TOO MUCH ABOUT DIFFERENT THINGS: SEVERAL DAYS

## 2020-08-24 ASSESSMENT — PAIN SCALES - GENERAL: PAINLEVEL: NO PAIN (0)

## 2020-08-24 NOTE — LETTER
"8/24/2020       RE: Janey Bain  [address] [address]     Dear Colleague,    Thank you for referring your patient, Janey Bain, to the [facility] at [facility]. Please see a copy of my visit note below.    REASON for VISIT ANNUAL    HPI   Janey Bain is a 19 year old female who is here for a preventive examination.  LMP was 8/10/20.  Gr0 using microgestin for BC - on this for 2 yrs - no problems. Wants to stay on it.  Wants a pap. No hx of STI's. She is sexually active. No vaginal itching burning or discharge.        Has had recurring throat discomfort with tonsils.  Has irritation few times/wk.Does have pus in the tonsils. Neck not sore - No fever. She is  moving back to [facility] for last year of school and will see someone there about this       Past Medical History:   Diagnosis Date     Mononucleosis      Pneumonia 2012    hospitalized         Current Outpatient Medications   Medication Sig Dispense Refill     norethindrone-ethinyl estradiol (MICROGESTIN 1/20) 1-20 MG-MCG tablet Take 1 tablet by mouth daily 84 tablet 3       Social History     Tobacco Use     Smoking status: Never Smoker     Smokeless tobacco: Never Used     Tobacco comment: nonsmoking home   Substance Use Topics     Alcohol use: No     Alcohol/week: 0.0 standard drinks     Comment: none      Drug use: No       Family History   Problem Relation Age of Onset     Breast Cancer Mother         early 50's; \"negative genetic testing\"     Lymphoma Maternal Grandmother      Colon Cancer Paternal Grandmother      Coronary Artery Disease Early Onset Paternal Grandfather      Cerebrovascular Disease Paternal Grandfather           Review Of Systems  Answers for HPI/ROS submitted by the patient on 8/24/2020   BELLE 7 TOTAL SCORE: 4  General Symptoms: No  Skin Symptoms: No  HENT Symptoms: Yes  EYE SYMPTOMS: No  HEART SYMPTOMS: No  LUNG SYMPTOMS: No  INTESTINAL SYMPTOMS: " "No  URINARY SYMPTOMS: No  GYNECOLOGIC SYMPTOMS: No  BREAST SYMPTOMS: No  SKELETAL SYMPTOMS: No  BLOOD SYMPTOMS: No  NERVOUS SYSTEM SYMPTOMS: No  MENTAL HEALTH SYMPTOMS: No  Ear pain: Yes  Ear discharge: No  Hearing loss: No  Tinnitus: No  Nosebleeds: No  Congestion: No  Sinus pain: No  Trouble swallowing: No   Voice hoarseness: No  Mouth sores: No  Sore throat: Yes  Tooth pain: No  Gum tenderness: No  Bleeding gums: No  Change in taste: No  Change in sense of smell: No  Dry mouth: No  Hearing aid used: No  Neck lump: No    OBJECTIVE:  /74   Pulse 99   Ht 1.613 m (5' 3.5\")   Wt 47.6 kg (104 lb 14.4 oz)   LMP 08/12/2020   BMI 18.29 kg/m    Gen:  young woman in NAD  HEENT: PERRL fundi red reflex  Ears clear with good light reflex.  OP mask  Neck  Supple.  Thyroid not palpable  No carotid bruits.  No LAD  CVS exam: S1, S2 normal, no murmur, click, rub or gallop, regular rate and rhythm, chest is clear without rales or wheezing, no pedal edema, no JVD, no hepatosplenomegaly.  Abd Soft ND NT  BS active  No masses or HSM  Ext   Good pulses. No edema  Musculoskeletal: spine is straight, extremities full ROM, no deformity  BREAST:  normal without suspicious masses, skin changes or axillary nodes, symmetric fibrous changes in both upper outer quadrants   Pelvic exam: normal vagina and vulva, normal cervix without lesions or tenderness, uterus normal size anteverted, adenxa normal in size without tenderness, pap smear done.  Rectal exam: not done  Neuro: Neurological exam reveals normal without focal findings, mental status, speech normal, alert and oriented x iii, FANY, cranial nerves 2-12 intact, muscle tone and strength normal and symmetric, reflexes normal and symmetric.      ASSESSMENT:  Healthy 20 yo on BC and comes in for annual and needs a pap      (Z00.00) Annual physical exam  (primary encounter diagnosis)  Comment:  Immunizations UTD, will get lipid, first pap due, will screen for STI's - didn't want HIV " tesitng   Plan: Pap imaged thin layer screen only - recommended        age 21 - 24 years, Lipid Panel, Chlamydia         trachomatis PCR, Neisseria gonorrhoeae PCR            (N92.6) Irregular menses  Comment: wants refill of BC  Plan: norethindrone-ethinyl estradiol (MICROGESTIN         1/20) 1-20 MG-MCG tablet                Maricel Ho MD, PhD

## 2020-08-24 NOTE — PATIENT INSTRUCTIONS
Birth control pills refilled  Blood work today  Pap today   STI testing today  Take a multivitamin with folate     Nutritious diet  Eat 1000 mg calcium total every day.  Many people achieve this by a diet containing calcium (milk, yogurt, cheese, and other dairy products, supplemented food) and 1 calcium pill. If you don't eat dairy, you should take a calcium supplement 2 times a day.  400  IU of vitamin D on daily basis.    Eat a variety of fruits and vegetables and eat whole grains.  Try to choose foods with a high NuVal score, if your grocery store shows this number. High numbers, like 80 or 90, are nutritious foods, and low scores, like 10 are less nutritious foods.          Men should drink no more than 2 alcoholic beverages daily on average; women should drink no more than 1 alcoholic beverage daily on average.    Everyone should avoid all tobacco and nicotine-containing products.    Exercise: Aim for:  Moderate activity (where you can still talk while doing it, such as walking about 100 steps per minute, or 3,000 steps in 30 minutes) for 30 minutes at least 5 days a week  Or  Vigorous exercise (you are working so hard you are breathing hard and fast and your heart rate has gone up, such as playing basketball or soccer) at least 75 minutes weekly    If you have trouble doing 30 minutes of activity, all at once, try small bursts of activity. Go to www.abefitness.com for suggestions on how you can do this at home or work.     All adults should try to do muscle strengthening exercise at least 2 days a week    Safety  Always wear bike/motorcycle helmet and seatbelt in a vehicle  Make sure your home has smoke and carbon monoxide detectors.  Wear broad spectrum sunscreen of at least SPF 15 on sun-exposed skin.    Preventing disease  See your dentist at least once a year  Have your eyes checked every 1-2 years.  Get a flu shot each year.

## 2020-08-24 NOTE — PROGRESS NOTES
"REASON for VISIT ANNUAL    HPI   Janey Bain is a 19 year old female who is here for a preventive examination.  LMP was 8/10/20.  Gr0 using microgestin for BC - on this for 2 yrs - no problems. Wants to stay on it.  Wants a pap. No hx of STI's. She is sexually active. No vaginal itching burning or discharge.        Has had recurring throat discomfort with tonsils.  Has irritation few times/wk.Does have pus in the tonsils. Neck not sore - No fever. She is  moving back to [facility] for last year of school and will see someone there about this       Past Medical History:   Diagnosis Date     Mononucleosis      Pneumonia 2012    hospitalized         Current Outpatient Medications   Medication Sig Dispense Refill     norethindrone-ethinyl estradiol (MICROGESTIN 1/20) 1-20 MG-MCG tablet Take 1 tablet by mouth daily 84 tablet 3       Social History     Tobacco Use     Smoking status: Never Smoker     Smokeless tobacco: Never Used     Tobacco comment: nonsmoking home   Substance Use Topics     Alcohol use: No     Alcohol/week: 0.0 standard drinks     Comment: none      Drug use: No       Family History   Problem Relation Age of Onset     Breast Cancer Mother         early 50's; \"negative genetic testing\"     Lymphoma Maternal Grandmother      Colon Cancer Paternal Grandmother      Coronary Artery Disease Early Onset Paternal Grandfather      Cerebrovascular Disease Paternal Grandfather           Review Of Systems  Answers for HPI/ROS submitted by the patient on 8/24/2020   BELLE 7 TOTAL SCORE: 4  General Symptoms: No  Skin Symptoms: No  HENT Symptoms: Yes  EYE SYMPTOMS: No  HEART SYMPTOMS: No  LUNG SYMPTOMS: No  INTESTINAL SYMPTOMS: No  URINARY SYMPTOMS: No  GYNECOLOGIC SYMPTOMS: No  BREAST SYMPTOMS: No  SKELETAL SYMPTOMS: No  BLOOD SYMPTOMS: No  NERVOUS SYSTEM SYMPTOMS: No  MENTAL HEALTH SYMPTOMS: No  Ear pain: Yes  Ear discharge: No  Hearing loss: No  Tinnitus: No  Nosebleeds: No  Congestion: No  Sinus pain: No  Trouble " "swallowing: No   Voice hoarseness: No  Mouth sores: No  Sore throat: Yes  Tooth pain: No  Gum tenderness: No  Bleeding gums: No  Change in taste: No  Change in sense of smell: No  Dry mouth: No  Hearing aid used: No  Neck lump: No    OBJECTIVE:  /74   Pulse 99   Ht 1.613 m (5' 3.5\")   Wt 47.6 kg (104 lb 14.4 oz)   LMP 08/12/2020   BMI 18.29 kg/m    Gen:  young woman in NAD  HEENT: PERRL fundi red reflex  Ears clear with good light reflex.  OP mask  Neck  Supple.  Thyroid not palpable  No carotid bruits.  No LAD  CVS exam: S1, S2 normal, no murmur, click, rub or gallop, regular rate and rhythm, chest is clear without rales or wheezing, no pedal edema, no JVD, no hepatosplenomegaly.  Abd Soft ND NT  BS active  No masses or HSM  Ext   Good pulses. No edema  Musculoskeletal: spine is straight, extremities full ROM, no deformity  BREAST:  normal without suspicious masses, skin changes or axillary nodes, symmetric fibrous changes in both upper outer quadrants   Pelvic exam: normal vagina and vulva, normal cervix without lesions or tenderness, uterus normal size anteverted, adenxa normal in size without tenderness, pap smear done.  Rectal exam: not done  Neuro: Neurological exam reveals normal without focal findings, mental status, speech normal, alert and oriented x iii, FANY, cranial nerves 2-12 intact, muscle tone and strength normal and symmetric, reflexes normal and symmetric.      ASSESSMENT:  Healthy 20 yo on BC and comes in for annual and needs a pap      (Z00.00) Annual physical exam  (primary encounter diagnosis)  Comment:  Immunizations UTD, will get lipid, first pap due, will screen for STI's - didn't want HIV tesitng   Plan: Pap imaged thin layer screen only - recommended        age 21 - 24 years, Lipid Panel, Chlamydia         trachomatis PCR, Neisseria gonorrhoeae PCR            (N92.6) Irregular menses  Comment: wants refill of BC  Plan: norethindrone-ethinyl estradiol (MICROGESTIN         1/20) " 1-20 MG-MCG tablet                  Maricel Ho MD, PhD

## 2020-08-25 LAB
C TRACH DNA SPEC QL NAA+PROBE: NEGATIVE
N GONORRHOEA DNA SPEC QL NAA+PROBE: NEGATIVE
SPECIMEN SOURCE: NORMAL
SPECIMEN SOURCE: NORMAL

## 2020-08-25 ASSESSMENT — ANXIETY QUESTIONNAIRES: GAD7 TOTAL SCORE: 4

## 2020-08-26 LAB
COPATH REPORT: NORMAL
PAP: NORMAL

## 2020-09-01 ENCOUNTER — VIRTUAL VISIT (OUTPATIENT)
Dept: FAMILY MEDICINE | Facility: OTHER | Age: 20
End: 2020-09-01
Payer: COMMERCIAL

## 2020-09-01 PROCEDURE — 99421 OL DIG E/M SVC 5-10 MIN: CPT | Performed by: PHYSICIAN ASSISTANT

## 2020-09-01 NOTE — PROGRESS NOTES
"Date: 2020 11:08:07  Clinician: Jad Le  Clinician NPI: [id number]  Patient: Janey Bain  Patient : 2000  Patient Address: [address] [place] [address]  Patient Phone: [phone]  Visit Protocol: URI  Patient Summary:  Janey is a 19 year old ( : 2000 ) female who initiated a Visit for cold, sinus infection, or influenza. When asked the question \"Please sign me up to receive news, health information and promotions from [facility].\", Janey responded \"No\".    Janey states her symptoms started gradually 7-9 days ago.   Her symptoms consist of a cough. She is experiencing mild difficulty breathing with activities but can speak normally in full sentences.   Symptom details   Cough: Janey coughs every 5-10 minutes and her cough is not more bothersome at night. Phlegm does not come into her throat when she coughs. She does not believe her cough is caused by post-nasal drip.    Janey denies having ear pain, headache, chills, malaise, fever, wheezing, sore throat, teeth pain, ageusia, diarrhea, nasal congestion, vomiting, rhinitis, nausea, myalgias, anosmia, and facial pain or pressure. She also denies having recent facial or sinus surgery in the past 60 days, taking antibiotic medication in the past month, and double sickening (worsening symptoms after initial improvement).   Precipitating events  She has not recently been exposed to someone with influenza. Janey has not been in close contact with any high risk individuals.   Pertinent COVID-19 (Coronavirus) information  In the past 14 days, Janey has not worked in a congregate living setting.   She does not work or volunteer as healthcare worker or a  and does not work or volunteer in a healthcare facility.   Janey also has not lived in a congregate living setting in the past 14 days. She does not live with a healthcare worker.   Janey has not had a close contact with a laboratory-confirmed COVID-19 patient " within 14 days of symptom onset.   Since December 2019, Janey and has had upper respiratory infection (URI) or influenza-like illness. Has not been diagnosed with lab-confirmed COVID-19 test      Date(s) of previous URI or influenza-like illness (free-text): 3/12-4/2     Symptoms Janey experienced during previous URI or influenza-like illness as reported by the patient (free-text): cough        Pertinent medical history  Janey does not get yeast infections when she takes antibiotics.   Janey does not need a return to work/school note.   Weight: 105 lbs   Janey does not smoke or use smokeless tobacco.   She denies pregnancy and denies breastfeeding. She has menstruated in the past month.   Additional information as reported by the patient (free text): I have had this cough now for a week and a half. Generally I feel a tickle in my throat that causes the cough, but I have also had periods where I feel I am not able to breathe in deeply enough. I feel slight tightness/pressure when I take deep breaths or hold my breath, more on the left side of my lungs. I have had no other symptoms of Covid-19 or anything else, and I have been taking my temperature often and it is consistently normal.   Weight: 105 lbs  A synchronous phone visit was initiated by the provider for the following reason: lung pain    MEDICATIONS: Microgestin 1/20 (21) oral, ALLERGIES: NKDA  Clinician Response:  Dear Janey,    Your symptoms show that you may have coronavirus (COVID-19). This illness can cause fever, cough and trouble breathing. Many people get a mild case and get better on their own. Some people can get very sick.  What should I do?  We would like to test you for this virus.   1. Please call [phone] to schedule your visit. Explain that you were referred by [facility] to have a COVID-19 test. Be ready to share your [facility] visit ID number.  The following will serve as your written order for this COVID Test, ordered by me, for the  "indication of suspected COVID [Z20.828]: The test will be ordered in [facility], our electronic health record, after you are scheduled. It will show as ordered and authorized by Abiodun Vivar MD.  Order: COVID-19 (Coronavirus) PCR [facility]OMATIC [facility].     2. When it's time for your COVID test:  Stay at least 6 feet away from others. (If someone will drive you to your test, stay in the backseat, as far away from the  as you can.)   Cover your mouth and nose with a mask, tissue or washcloth.  Go straight to the testing site. Don't make any stops on the way there or back.      3.Starting now: Stay home and away from others (self-isolate) until:   You've had no fever---and no medicine that reduces fever---for one full day (24 hours). And...   Your other symptoms have gotten better. For example, your cough or breathing has improved. And...   At least 10 days have passed since your symptoms started.       During this time, don't leave the house except for testing or medical care.   Stay in your own room, even for meals. Use your own bathroom if you can.   Stay away from others in your home. No hugging, kissing or shaking hands. No visitors.  Don't go to work, school or anywhere else.    Clean \"high touch\" surfaces often (doorknobs, counters, handles, etc.). Use a household cleaning spray or wipes. You'll find a full list of  on the EPA website: www.epa.gov/pesticide-registration/list-n-disinfectants-use-against-sars-cov-2.   Cover your mouth and nose with a mask, tissue or washcloth to avoid spreading germs.  Wash your hands and face often. Use soap and water.  Caregivers in these groups are at risk for severe illness due to COVID-19:  o People 65 years and older  o People who live in a nursing home or long-term care facility  o People with chronic disease (lung, heart, cancer, diabetes, kidney, liver, immunologic)  o People who have a weakened immune system, including those who:   Are in cancer " treatment  Take medicine that weakens the immune system, such as corticosteroids  Had a bone marrow or organ transplant  Have an immune deficiency  Have poorly controlled HIV or AIDS  Are obese (body mass index of 40 or higher)  Smoke regularly   o Caregivers should wear gloves while washing dishes, handling laundry and cleaning bedrooms and bathrooms.  o Use caution when washing and drying laundry: Don't shake dirty laundry, and use the warmest water setting that you can.  o For more tips, go to www.cdc.gov/coronavirus/2019-ncov/downloads/10Things.pdf.    4.Sign up for [employer]. We know it's scary to hear that you might have COVID-19. We want to track your symptoms to make sure you're okay over the next 2 weeks. Please look for an email from [employer]---this is a free, online program that we'll use to keep in touch. To sign up, follow the link in the email. Learn more at [email or web address]  How can I take care of myself?   Get lots of rest. Drink extra fluids (unless a doctor has told you not to).   Take Tylenol (acetaminophen) for fever or pain. If you have liver or kidney problems, ask your family doctor if it's okay to take Tylenol.   Adults can take either:    650 mg (two 325 mg pills) every 4 to 6 hours, or...   1,000 mg (two 500 mg pills) every 8 hours as needed.    Note: Don't take more than 3,000 mg in one day. Acetaminophen is found in many medicines (both prescribed and over-the-counter medicines). Read all labels to be sure you don't take too much.   For children, check the Tylenol bottle for the right dose. The dose is based on the child's age or weight.    If you have other health problems (like cancer, heart failure, an organ transplant or severe kidney disease): Call your specialty clinic if you don't feel better in the next 2 days.       Know when to call 911. Emergency warning signs include:    Trouble breathing or shortness of breath Pain or pressure in the chest that doesn't  go away Feeling confused like you haven't felt before, or not being able to wake up Bluish-colored lips or face.  Where can I get more information?    [employer] [facility] -- About COVID-19: [email or web address]   CDC -- What to Do If You're Sick: www.cdc.gov/coronavirus/2019-ncov/about/steps-when-sick.html   CDC -- Ending Home Isolation: www.cdc.gov/coronavirus/2019-ncov/hcp/disposition-in-home-patients.html   CDC -- Caring for Someone: www.cdc.gov/coronavirus/2019-ncov/if-you-are-sick/care-for-someone.html   [facility] -- Interim Guidance for Hospital Discharge to Home: [email or web address]./[email or web address]/coronavirus/hcp/hospdischarge.pdf   [facility] clinical trials (COVID-19 research studies): [email or web address]-clinical-trials    Below are the COVID-19 hotlines at the [place] Department of [facility] ([facility]). Interpreters are available.    For health questions: Call 710-936-8995 or 1-351.941.9187 (7 a.m. to 7 p.m.) For questions about schools and childcare: Call 943-462-7018 or 1-187.665.8165 (7 a.m. to 7 p.m.)     Diagnosis: Acute bronchitis, unspecified  Diagnosis ICD: J20.9  Triage Notes: I reviewed the patient's history, verified their identity, and explained the Visit process.    patient feels like she just needs inhaler and pills for coughing  Synchronous Triage: phone, status: completed, duration: 157 seconds  Prescription: benzonatate (Tessalon Perles) 100 mg oral capsule 30 capsule, 10 days supply. Take 1 capsule by mouth 3 times per day for 10 days as needed. Refills: 0, Refill as needed: no, Allow substitutions: yes  Prescription: albuterol sulfate (Proventil HFA) 90 mcg/actuation inhalation HFA aerosol inhaler 1 200 inhalation aerosol with adapter (proventil hfa or equivalent), 0 days supply. Inhale 2 puffs every 4 hours as needed. Refills: 0, Refill as needed: no, Allow substitutions: yes  Pharmacy: [id number] - [phone] - [address],  [place]  [address]

## 2020-11-14 ENCOUNTER — HEALTH MAINTENANCE LETTER (OUTPATIENT)
Age: 20
End: 2020-11-14

## 2021-07-09 ENCOUNTER — MYC MEDICAL ADVICE (OUTPATIENT)
Dept: FAMILY MEDICINE | Facility: CLINIC | Age: 21
End: 2021-07-09

## 2021-07-14 ENCOUNTER — MYC MEDICAL ADVICE (OUTPATIENT)
Dept: FAMILY MEDICINE | Facility: CLINIC | Age: 21
End: 2021-07-14

## 2021-07-14 ENCOUNTER — MYC MEDICAL ADVICE (OUTPATIENT)
Dept: INTERNAL MEDICINE | Facility: CLINIC | Age: 21
End: 2021-07-14

## 2021-07-14 NOTE — TELEPHONE ENCOUNTER
Attempted to call patient x2 today with no answer. Left VM stating that I need to fill in both toe COVID 19 and TB spots on her immunization form for college. I asked that she call back with the answers or send them through [employer]. I also asked for an email address to send the papers back to.  Patricia Masterson RN

## 2021-07-28 ENCOUNTER — MYC MEDICAL ADVICE (OUTPATIENT)
Dept: INTERNAL MEDICINE | Facility: CLINIC | Age: 21
End: 2021-07-28

## 2021-07-28 NOTE — TELEPHONE ENCOUNTER
Completed Janey's immunization records form for [place]. I sent it to her email along with her immunization record that is in our system. Notified her via [email or web address] and on her voicemail, as I couldn't reach her to speak with her directly.    Patricia Masterson RN

## 2021-09-12 ENCOUNTER — HEALTH MAINTENANCE LETTER (OUTPATIENT)
Age: 21
End: 2021-09-12

## 2021-11-07 ENCOUNTER — HEALTH MAINTENANCE LETTER (OUTPATIENT)
Age: 21
End: 2021-11-07

## 2022-11-19 ENCOUNTER — HEALTH MAINTENANCE LETTER (OUTPATIENT)
Age: 22
End: 2022-11-19

## 2023-12-25 ENCOUNTER — HOSPITAL ENCOUNTER (EMERGENCY)
Facility: CLINIC | Age: 23
Discharge: HOME | End: 2023-12-25
Payer: COMMERCIAL

## 2024-01-27 ENCOUNTER — HEALTH MAINTENANCE LETTER (OUTPATIENT)
Age: 24
End: 2024-01-27

## 2025-02-01 ENCOUNTER — HEALTH MAINTENANCE LETTER (OUTPATIENT)
Age: 25
End: 2025-02-01